# Patient Record
Sex: FEMALE | Race: WHITE | Employment: OTHER | URBAN - METROPOLITAN AREA
[De-identification: names, ages, dates, MRNs, and addresses within clinical notes are randomized per-mention and may not be internally consistent; named-entity substitution may affect disease eponyms.]

---

## 2024-05-09 ENCOUNTER — OFFICE VISIT (OUTPATIENT)
Dept: PRIMARY CARE CLINIC | Facility: CLINIC | Age: 69
End: 2024-05-09
Payer: MEDICARE

## 2024-05-09 VITALS
DIASTOLIC BLOOD PRESSURE: 68 MMHG | HEART RATE: 86 BPM | OXYGEN SATURATION: 96 % | SYSTOLIC BLOOD PRESSURE: 116 MMHG | BODY MASS INDEX: 17.68 KG/M2 | WEIGHT: 112.63 LBS | HEIGHT: 67 IN | RESPIRATION RATE: 16 BRPM

## 2024-05-09 DIAGNOSIS — F17.210 CIGARETTE NICOTINE DEPENDENCE WITHOUT COMPLICATION: ICD-10-CM

## 2024-05-09 DIAGNOSIS — E78.5 HYPERLIPIDEMIA, UNSPECIFIED HYPERLIPIDEMIA TYPE: ICD-10-CM

## 2024-05-09 DIAGNOSIS — Z12.31 SCREENING MAMMOGRAM FOR BREAST CANCER: ICD-10-CM

## 2024-05-09 DIAGNOSIS — D69.2 SENILE PURPURA: ICD-10-CM

## 2024-05-09 DIAGNOSIS — Z78.0 POSTMENOPAUSAL: ICD-10-CM

## 2024-05-09 DIAGNOSIS — Z12.11 SCREENING FOR COLON CANCER: ICD-10-CM

## 2024-05-09 DIAGNOSIS — Z11.59 ENCOUNTER FOR HEPATITIS C SCREENING TEST FOR LOW RISK PATIENT: ICD-10-CM

## 2024-05-09 DIAGNOSIS — Z76.89 ENCOUNTER TO ESTABLISH CARE WITH NEW DOCTOR: Primary | ICD-10-CM

## 2024-05-09 PROCEDURE — 1158F ADVNC CARE PLAN TLK DOCD: CPT | Mod: CPTII,S$GLB,, | Performed by: STUDENT IN AN ORGANIZED HEALTH CARE EDUCATION/TRAINING PROGRAM

## 2024-05-09 PROCEDURE — 3288F FALL RISK ASSESSMENT DOCD: CPT | Mod: CPTII,S$GLB,, | Performed by: STUDENT IN AN ORGANIZED HEALTH CARE EDUCATION/TRAINING PROGRAM

## 2024-05-09 PROCEDURE — 1160F RVW MEDS BY RX/DR IN RCRD: CPT | Mod: CPTII,S$GLB,, | Performed by: STUDENT IN AN ORGANIZED HEALTH CARE EDUCATION/TRAINING PROGRAM

## 2024-05-09 PROCEDURE — 99214 OFFICE O/P EST MOD 30 MIN: CPT | Mod: S$GLB,,, | Performed by: STUDENT IN AN ORGANIZED HEALTH CARE EDUCATION/TRAINING PROGRAM

## 2024-05-09 PROCEDURE — 3008F BODY MASS INDEX DOCD: CPT | Mod: CPTII,S$GLB,, | Performed by: STUDENT IN AN ORGANIZED HEALTH CARE EDUCATION/TRAINING PROGRAM

## 2024-05-09 PROCEDURE — 99999 PR PBB SHADOW E&M-NEW PATIENT-LVL IV: CPT | Mod: PBBFAC,,, | Performed by: STUDENT IN AN ORGANIZED HEALTH CARE EDUCATION/TRAINING PROGRAM

## 2024-05-09 PROCEDURE — 3074F SYST BP LT 130 MM HG: CPT | Mod: CPTII,S$GLB,, | Performed by: STUDENT IN AN ORGANIZED HEALTH CARE EDUCATION/TRAINING PROGRAM

## 2024-05-09 PROCEDURE — 1125F AMNT PAIN NOTED PAIN PRSNT: CPT | Mod: CPTII,S$GLB,, | Performed by: STUDENT IN AN ORGANIZED HEALTH CARE EDUCATION/TRAINING PROGRAM

## 2024-05-09 PROCEDURE — 3078F DIAST BP <80 MM HG: CPT | Mod: CPTII,S$GLB,, | Performed by: STUDENT IN AN ORGANIZED HEALTH CARE EDUCATION/TRAINING PROGRAM

## 2024-05-09 PROCEDURE — 1101F PT FALLS ASSESS-DOCD LE1/YR: CPT | Mod: CPTII,S$GLB,, | Performed by: STUDENT IN AN ORGANIZED HEALTH CARE EDUCATION/TRAINING PROGRAM

## 2024-05-09 PROCEDURE — 1159F MED LIST DOCD IN RCRD: CPT | Mod: CPTII,S$GLB,, | Performed by: STUDENT IN AN ORGANIZED HEALTH CARE EDUCATION/TRAINING PROGRAM

## 2024-05-09 NOTE — ASSESSMENT & PLAN NOTE
Reviewed PMHx, social hx, labs, vaccinations, screenings, medications. Ordered dexa, mammo, fobt and labs today.

## 2024-05-09 NOTE — PROGRESS NOTES
INTERNAL MEDICINE INITIAL VISIT NOTE      CHIEF COMPLAINT     Chief Complaint   Patient presents with    Establish Care    Pre-op Exam       HPI     Melani Brandt is a 68 y.o.  female who presents with sciatica of left leg, scoliosis, current tobacco use (12 cigs a day), trigger fingers, psoriasis (only skin is affected), hx of basal cell (3), squamous cell carcinoma(2) is here to establish care with me.  -wakes up with pain but gets better as the day goes on.   -no falls in the last year  -needs left hip replacement  -needs preop form filled out for cataract surgery but cannot do it until may 20th.     Advance Care Planning     Date: 05/09/2024    Power of   I initiated the process of voluntary advance care planning today and explained the importance of this process to the patient.  I introduced the concept of advance directives to the patient, as well. Then the patient received detailed information about the importance of designating a Health Care Power of  (HCPOA). She was also instructed to communicate with this person about their wishes for future healthcare, should she become sick and lose decision-making capacity. The patient has not previously appointed a HCPOA. After our discussion, the patient has decided to complete a HCPOA and has appointed her brother, health care agent: ashlee Brandt & health care agent number: [phone]. I spent a total time of 5 minutes discussing this issue with the patient.          Past Medical History:  Past Medical History:   Diagnosis Date    Sciatica of left side     Scoliosis        Past Surgical History:  Past Surgical History:   Procedure Laterality Date    BASAL CELL CARCINOMA EXCISION      s/p removal x3    breast implants Bilateral 1999    chin implant  2009    CLAVICLE SURGERY Right 1955    dental implants  2009    SQUAMOUS CELL CARCINOMA EXCISION      x2    TONSILLECTOMY  1959       Allergies:  Review of patient's allergies indicates:  "  Allergen Reactions    Chloromycetin [chloramphenicol]     Formaldehyde analogues Hives       Home Medications:  Prior to Admission medications    Not on File       Family History:  Family History   Problem Relation Name Age of Onset    No Known Problems Mother      Melanoma Father      Cardiomyopathy Father      Diabetes type II Father         Social History:  Social History     Tobacco Use    Smoking status: Every Day     Current packs/day: 0.50     Average packs/day: 0.5 packs/day for 50.4 years (25.2 ttl pk-yrs)     Types: Cigarettes     Start date: 1974    Smokeless tobacco: Never   Substance Use Topics    Alcohol use: Yes     Alcohol/week: 3.0 standard drinks of alcohol     Types: 3 Glasses of wine per week    Drug use: Never       Review of Systems:  Review of Systems   Constitutional:  Negative for chills and fever.   HENT:  Negative for rhinorrhea and sore throat.    Respiratory:  Negative for cough, chest tightness and shortness of breath.    Cardiovascular:  Negative for chest pain.   Gastrointestinal:  Negative for abdominal pain, blood in stool, constipation and diarrhea.   Genitourinary:  Negative for dysuria and hematuria.   Neurological:  Negative for dizziness, light-headedness and headaches.       Health Maintainence:   Td -due   Flu -refuses   Prevnar -does not like vaccines   Pneumovax -does not like vaccines  Zoster recommended it  MMG - ordered today  C-SCOPE -had one 15 yrs ago. Bad experience with prep. Will order fobt  DEXA ordered today  Hep C screening will order today  Low Dose CT scan in pts if 55-81 y/o with 30 pack yr smoking hx and currently smoke or have quit within past 15 yrs. Ordered it today. Never had one. She is willing to do it    PHYSICAL EXAM     /68 (BP Location: Left arm, Patient Position: Sitting, BP Method: Small (Manual))   Pulse 86   Resp 16   Ht 5' 7" (1.702 m)   Wt 51.1 kg (112 lb 10.5 oz)   SpO2 96%   BMI 17.64 kg/m²     GEN - A+OX4, NAD   HEENT - " PERRL  Neck - No cervical LAD.   CV - RRR, no m/r or carotid bruit  Chest - CTAB, no wheezing or rhonchi  Abd - S/NT/ND/+BS.   Ext - 2+BDP and radial pulses. No LE edema.   MSK -Normal gait.   Skin - No rash.    LABS     None in the chart. Ordered today    ASSESSMENT/PLAN   1. Encounter to establish care with new doctor  Assessment & Plan:  Reviewed PMHx, social hx, labs, vaccinations, screenings, medications. Ordered dexa, mammo, fobt and labs today.       2. Screening mammogram for breast cancer  -     Mammo Digital Screening Bilat w/ Josue; Future; Expected date: 05/09/2024    3. Postmenopausal  -     DXA Bone Density Axial Skeleton 1 or more sites; Future; Expected date: 05/09/2024    4. Screening for colon cancer  -     Fecal Immunochemical Test (iFOBT); Future; Expected date: 05/09/2024    5. Cigarette nicotine dependence without complication  -     CT Chest Lung Screening Low Dose; Future; Expected date: 05/09/2024    6. Hyperlipidemia, unspecified hyperlipidemia type  Assessment & Plan:  No lipid panel in chart. Ordered today. Not on statin. Uses tobacco. Will monitor     Orders:  -     CBC Auto Differential; Future; Expected date: 05/09/2024  -     Comprehensive Metabolic Panel; Future; Expected date: 05/09/2024  -     TSH; Future; Expected date: 05/09/2024  -     Lipid Panel; Future; Expected date: 05/09/2024    7. Encounter for hepatitis C screening test for low risk patient  -     HEPATITIS C ANTIBODY; Future; Expected date: 05/09/2024    8. Senile purpura  Assessment & Plan:  Per patient she bruises easily. No signs of major bleeding. Will monitor          RTC in 3 months, sooner if needed and depending on labs.    Bradley Robertson MD  Department of Internal Medicine   1:31 PM

## 2024-05-16 ENCOUNTER — HOSPITAL ENCOUNTER (OUTPATIENT)
Dept: RADIOLOGY | Facility: HOSPITAL | Age: 69
Discharge: HOME | End: 2024-05-16
Attending: STUDENT IN AN ORGANIZED HEALTH CARE EDUCATION/TRAINING PROGRAM
Payer: MEDICARE

## 2024-05-16 VITALS — WEIGHT: 112 LBS | HEIGHT: 67 IN | BODY MASS INDEX: 17.58 KG/M2

## 2024-05-16 DIAGNOSIS — Z78.0 POSTMENOPAUSAL: ICD-10-CM

## 2024-05-16 DIAGNOSIS — F17.210 CIGARETTE NICOTINE DEPENDENCE WITHOUT COMPLICATION: ICD-10-CM

## 2024-05-16 DIAGNOSIS — Z12.31 SCREENING MAMMOGRAM FOR BREAST CANCER: ICD-10-CM

## 2024-05-16 PROCEDURE — 77080 DXA BONE DENSITY AXIAL: CPT | Mod: 26,,, | Performed by: INTERNAL MEDICINE

## 2024-05-16 PROCEDURE — 77067 SCR MAMMO BI INCL CAD: CPT | Mod: TC

## 2024-05-16 PROCEDURE — 77063 BREAST TOMOSYNTHESIS BI: CPT | Mod: 26,,, | Performed by: RADIOLOGY

## 2024-05-16 PROCEDURE — 77080 DXA BONE DENSITY AXIAL: CPT | Mod: TC

## 2024-05-16 PROCEDURE — 71271 CT THORAX LUNG CANCER SCR C-: CPT | Mod: 26,,, | Performed by: STUDENT IN AN ORGANIZED HEALTH CARE EDUCATION/TRAINING PROGRAM

## 2024-05-16 PROCEDURE — 71271 CT THORAX LUNG CANCER SCR C-: CPT | Mod: TC

## 2024-05-16 PROCEDURE — 77067 SCR MAMMO BI INCL CAD: CPT | Mod: 26,,, | Performed by: RADIOLOGY

## 2024-05-21 ENCOUNTER — PATIENT MESSAGE (OUTPATIENT)
Dept: PRIMARY CARE CLINIC | Facility: CLINIC | Age: 69
End: 2024-05-21
Payer: MEDICARE

## 2024-05-21 DIAGNOSIS — F17.210 NICOTINE DEPENDENCE, CIGARETTES, UNCOMPLICATED: ICD-10-CM

## 2024-05-21 DIAGNOSIS — D64.9 ANEMIA, UNSPECIFIED TYPE: Primary | ICD-10-CM

## 2024-05-21 DIAGNOSIS — R91.1 SOLITARY PULMONARY NODULE: ICD-10-CM

## 2024-05-21 DIAGNOSIS — R93.89 ABNORMAL FINDING ON CT SCAN: ICD-10-CM

## 2024-05-21 NOTE — TELEPHONE ENCOUNTER
Anemic. Suspect CORBIN. Ordered iron panel. She will get it drawn. Lipids elevated. She would like to try diet. Asked pt to avoid fried, fatty food.   Also discussed LDCT chest. Needs to repeat in 6 months which I already ordered. Due 11/12/2024. Ordered lumbar xray to look at that area noted on LDCT.

## 2024-05-22 ENCOUNTER — TELEPHONE (OUTPATIENT)
Dept: PRIMARY CARE CLINIC | Facility: CLINIC | Age: 69
End: 2024-05-22
Payer: MEDICARE

## 2024-05-22 DIAGNOSIS — M81.0 AGE-RELATED OSTEOPOROSIS WITHOUT CURRENT PATHOLOGICAL FRACTURE: Primary | ICD-10-CM

## 2024-05-22 NOTE — TELEPHONE ENCOUNTER
Called PT. Informed PT that her mammogram was negative masses. Informed PT she Will repeat in 1 yr. Also informed PT, she does have osteoporosis. Asked PT is she Would she be interested in treatment? . . . *PT STATED, YES. SHE IS INTERESTED IN TREATMENT.           *PLEASE ADVISE.

## 2024-05-22 NOTE — TELEPHONE ENCOUNTER
Called PT. Informed PT the  Has placed a referral for endocrine. Informed PT They will call her a week later to schedule an appointment.

## 2024-05-24 ENCOUNTER — TELEPHONE (OUTPATIENT)
Dept: PRIMARY CARE CLINIC | Facility: CLINIC | Age: 69
End: 2024-05-24
Payer: MEDICARE

## 2024-05-24 ENCOUNTER — HOSPITAL ENCOUNTER (OUTPATIENT)
Dept: RADIOLOGY | Facility: HOSPITAL | Age: 69
Discharge: HOME | End: 2024-05-24
Attending: STUDENT IN AN ORGANIZED HEALTH CARE EDUCATION/TRAINING PROGRAM
Payer: MEDICARE

## 2024-05-24 DIAGNOSIS — R93.89 ABNORMAL FINDING ON CT SCAN: ICD-10-CM

## 2024-05-24 PROCEDURE — 72100 X-RAY EXAM L-S SPINE 2/3 VWS: CPT | Mod: 26,,, | Performed by: RADIOLOGY

## 2024-05-24 PROCEDURE — 72100 X-RAY EXAM L-S SPINE 2/3 VWS: CPT | Mod: TC

## 2024-05-24 NOTE — TELEPHONE ENCOUNTER
----- Message from Bradley Robertson MD sent at 5/24/2024  1:50 PM CDT -----  Please tell patient that her iron levels are low. I would like her to start taking iron supplements every other day with any citrus fruit or juice that contains vitamin c. The iron supplement will be over the counter, she can ask the pharmacist to make sure she is getting the correct one.

## 2024-05-24 NOTE — TELEPHONE ENCOUNTER
Called PT. Informed PT that her iron levels are low. Informed PT the DR. would like her to start taking iron supplements every other day with any citrus fruit or juice that contains vitamin c. Informed PT The iron supplement will be over the counter, and she can ask the pharmacist to make sure she is getting the correct one.

## 2024-05-30 ENCOUNTER — LAB VISIT (OUTPATIENT)
Dept: LAB | Facility: HOSPITAL | Age: 69
End: 2024-05-30
Attending: STUDENT IN AN ORGANIZED HEALTH CARE EDUCATION/TRAINING PROGRAM
Payer: MEDICARE

## 2024-05-30 DIAGNOSIS — Z12.11 SCREENING FOR COLON CANCER: ICD-10-CM

## 2024-05-30 PROCEDURE — 82274 ASSAY TEST FOR BLOOD FECAL: CPT | Performed by: STUDENT IN AN ORGANIZED HEALTH CARE EDUCATION/TRAINING PROGRAM

## 2024-06-04 ENCOUNTER — OFFICE VISIT (OUTPATIENT)
Dept: ENDOCRINOLOGY | Facility: CLINIC | Age: 69
End: 2024-06-04
Payer: MEDICARE

## 2024-06-04 ENCOUNTER — PATIENT MESSAGE (OUTPATIENT)
Dept: PRIMARY CARE CLINIC | Facility: CLINIC | Age: 69
End: 2024-06-04
Payer: MEDICARE

## 2024-06-04 ENCOUNTER — TELEPHONE (OUTPATIENT)
Dept: PRIMARY CARE CLINIC | Facility: CLINIC | Age: 69
End: 2024-06-04
Payer: MEDICARE

## 2024-06-04 VITALS
HEIGHT: 62 IN | OXYGEN SATURATION: 93 % | BODY MASS INDEX: 20.71 KG/M2 | WEIGHT: 112.56 LBS | SYSTOLIC BLOOD PRESSURE: 114 MMHG | HEART RATE: 84 BPM | DIASTOLIC BLOOD PRESSURE: 64 MMHG

## 2024-06-04 DIAGNOSIS — R19.5 POSITIVE FIT (FECAL IMMUNOCHEMICAL TEST): Primary | ICD-10-CM

## 2024-06-04 DIAGNOSIS — M81.0 AGE-RELATED OSTEOPOROSIS WITHOUT CURRENT PATHOLOGICAL FRACTURE: ICD-10-CM

## 2024-06-04 DIAGNOSIS — E78.5 HYPERLIPIDEMIA, UNSPECIFIED HYPERLIPIDEMIA TYPE: Primary | ICD-10-CM

## 2024-06-04 LAB — HEMOCCULT STL QL IA: POSITIVE

## 2024-06-04 PROCEDURE — 1125F AMNT PAIN NOTED PAIN PRSNT: CPT | Mod: CPTII,S$GLB,, | Performed by: NURSE PRACTITIONER

## 2024-06-04 PROCEDURE — 1101F PT FALLS ASSESS-DOCD LE1/YR: CPT | Mod: CPTII,S$GLB,, | Performed by: NURSE PRACTITIONER

## 2024-06-04 PROCEDURE — 3078F DIAST BP <80 MM HG: CPT | Mod: CPTII,S$GLB,, | Performed by: NURSE PRACTITIONER

## 2024-06-04 PROCEDURE — 99999 PR PBB SHADOW E&M-EST. PATIENT-LVL IV: CPT | Mod: PBBFAC,,, | Performed by: NURSE PRACTITIONER

## 2024-06-04 PROCEDURE — 3288F FALL RISK ASSESSMENT DOCD: CPT | Mod: CPTII,S$GLB,, | Performed by: NURSE PRACTITIONER

## 2024-06-04 PROCEDURE — 99204 OFFICE O/P NEW MOD 45 MIN: CPT | Mod: S$GLB,,, | Performed by: NURSE PRACTITIONER

## 2024-06-04 PROCEDURE — 1159F MED LIST DOCD IN RCRD: CPT | Mod: CPTII,S$GLB,, | Performed by: NURSE PRACTITIONER

## 2024-06-04 PROCEDURE — G2211 COMPLEX E/M VISIT ADD ON: HCPCS | Mod: S$GLB,,, | Performed by: NURSE PRACTITIONER

## 2024-06-04 PROCEDURE — 1160F RVW MEDS BY RX/DR IN RCRD: CPT | Mod: CPTII,S$GLB,, | Performed by: NURSE PRACTITIONER

## 2024-06-04 PROCEDURE — 3074F SYST BP LT 130 MM HG: CPT | Mod: CPTII,S$GLB,, | Performed by: NURSE PRACTITIONER

## 2024-06-04 PROCEDURE — 3008F BODY MASS INDEX DOCD: CPT | Mod: CPTII,S$GLB,, | Performed by: NURSE PRACTITIONER

## 2024-06-04 RX ORDER — PREDNISOLONE ACETATE 10 MG/ML
1 SUSPENSION/ DROPS OPHTHALMIC 4 TIMES DAILY
COMMUNITY
Start: 2024-05-23

## 2024-06-04 RX ORDER — KETOROLAC TROMETHAMINE 5 MG/ML
1 SOLUTION OPHTHALMIC 4 TIMES DAILY
COMMUNITY
Start: 2024-05-23

## 2024-06-04 RX ORDER — LOTEPREDNOL ETABONATE 3.8 MG/G
GEL OPHTHALMIC
COMMUNITY
Start: 2024-05-23

## 2024-06-04 RX ORDER — BROMFENAC SODIUM 0.7 MG/ML
SOLUTION/ DROPS OPHTHALMIC
COMMUNITY
Start: 2024-05-23

## 2024-06-04 RX ORDER — IBUPROFEN 800 MG/1
800 TABLET ORAL 3 TIMES DAILY
COMMUNITY
Start: 2024-01-29

## 2024-06-04 RX ORDER — OFLOXACIN 3 MG/ML
1 SOLUTION/ DROPS OPHTHALMIC 4 TIMES DAILY
COMMUNITY
Start: 2024-05-23

## 2024-06-04 NOTE — PROGRESS NOTES
Subjective:      Patient ID: Melani Brandt is a 69 y.o. female.    Chief Complaint:  Osteoporosis    History of Present Illness  Melani Brandt presents today for initial evaluation & management of osteoporosis. This is her first visit with me.     With regards to osteoporosis:     Family history of osteoporosis in none    Menopause at age in her late 40s and was on HRT for a short time     current medication: none     Calcium intake-citracal 1200 mg daily   Vit D intake-1000 IU daily (recently started last week)    Weight bearing exercise- she has not been exercising but plans to start doing lunges and squats. Has been suffering with sciatica and back pain. Limited by back pain.   Recent falls - denies  Denies use of assistive devices     They have made fall precautions in house   Dental work - she has implants - finished around 2023.    No recent fractures   Right collar bone at age 1  + significant height loss (>2 inches)    tob use - smoking cigs 10 daily (has decreased)   etoh use- cocktail with 1 oz of liquor or 4oz of wine     + current diarrhea or h/o malabsorption but attributes to stress     Denies chronic exposure to steroid therapy, anticoagulants, proton pump inhibitors or antiseizure medications.     + GERD, indigestion when bending over   denies gastric bypass surgery.     Denies history of thyroid disease, kidney stones or kidney disease.   + anemia and on Fe    Denies active malignancy, history of malignancy the involved the bone, prior radiation treatment, or unexplained elevations of alk phos on labs     She denies MI or stroke in last year     She is scheduled for left hip [facility] in July 2024 at [facility]    Last BMD dated 5/16/2024   COMPARISON:  No Comparisons     FINDINGS:  Lumbar spine (L1-L4):               T-score is 2.6, and Z-score is 4.7.   Femoral neck:                          T-score is -3.1, and Z-score is -1.2   Total hip:                                T-score is -3.1, and  Z-score is -1.6.   Distal 1/3 radius:                      Not applicable     Fracture Risk (FRAX)  17% risk of a major osteoporotic fracture in the next 10 years.  8.5% risk of hip fracture in the next 10 years.   Impression:   *Osteoporosis based on T-score below -2.5.  *Fracture risk is very high due to T-score below -3.0.     RECOMMENDATIONS:  *Daily calcium intake 1155-8892 mg, dietary sources preferred; Vitamin D 5394-1455 IU daily.  *Weight bearing exercise and fall precautions.  *If patient smokes recommend cessation.  *Given very high fracture risk, would consider anabolic agents (including teriparatide, abaloparatide, or romosozumab), denosumab or zoledronic acid as the preferred treatment options. Oral bisphosphonates can be considered as second-line therapy.  *Repeat BMD in 2 years..    5/24/2024 XRAY  COMPARISON:  Present exam interpreted after review of the appearance of the visualized spine as seen on a CT chest lung screening study May 16, 2024     FINDINGS:  Thoracolumbar levocurvature.  No acute fractures, preserved vertebral body heights and pedicles.  Scattered variable size of vertebral body endplate osteophytes, most prevalent about the region of the maximum curvature of the spine which is at the L1-L2, L2-L3, L3-L4, less so L4-L5 levels.  No spondylolysis.  Allowing for the scoliotic curve, no definite findings of anterior or retrolisthesis.  Severe disc narrowing, vacuum phenomenon, and opposing endplate sclerosis seen at L1-L2 and L3-L4 levels.  Severe disc narrowing and vacuum phenomenon seen at L2-L3 level.  Mild disc narrowing L4-L5 level.  Moderate disc narrowing and vacuum phenomenon L5-S1 level.  Multilevel facet arthropathic changes.  Intact right and left SI joints.  Impression:  As above    Review of Systems   Musculoskeletal:  Positive for arthralgias and back pain. Negative for gait problem and myalgias.     Objective:   Physical Exam  Constitutional:       Appearance: Normal  "appearance.   Musculoskeletal:      Comments: Scoliosis  No point tenderness to spine  Stiff gait; right leg shorter than left   Neurological:      Mental Status: She is alert.         Visit Vitals  /64 (BP Location: Left arm, Patient Position: Sitting, BP Method: Large (Manual))   Pulse 84   Ht 5' 2" (1.575 m)   Wt 51.1 kg (112 lb 8.7 oz)   SpO2 (!) 93%   BMI 20.58 kg/m²        Lab Review:   No results found for: "HGBA1C"   Lab Results   Component Value Date    CHOL 213 (H) 05/16/2024    HDL 60 05/16/2024    LDLCALC 136.4 05/16/2024    TRIG 83 05/16/2024    CHOLHDL 28.2 05/16/2024     Lab Results   Component Value Date     05/16/2024    K 4.6 05/16/2024     05/16/2024    CO2 24 05/16/2024     05/16/2024    BUN 15 05/16/2024    CREATININE 0.8 05/16/2024    CALCIUM 9.7 05/16/2024    PROT 6.9 05/16/2024    ALBUMIN 3.8 05/16/2024    BILITOT 0.3 05/16/2024    ALKPHOS 98 05/16/2024    AST 21 05/16/2024    ALT 13 05/16/2024    ANIONGAP 11 05/16/2024    TSH 2.138 05/16/2024     No results found for: "FYSMBRDT38PS"  Assessment and Plan     1. Hyperlipidemia, unspecified hyperlipidemia type        2. Age-related osteoporosis without current pathological fracture  Calcium, Timed Urine    Creatinine Clearance, Timed    Protein Electrophoresis, Serum    PTH, Intact    Tissue Transglutaminase, IgA    Renal Function Panel    Vitamin D    Ambulatory referral/consult to Endocrinology        Age-related osteoporosis without current pathological fracture  -- Risks include low weight,  race, menopause, smoking, ETOH  -- Reviewed basics of quantity versus quality  -- Reassuring she is not fracturing   -- Plan work up of accelerated bone loss to include 24 urine calcium, PTH, vit D, CMP   -- RDA of calcium (4632-1236 mg /day in divided doses) and vitamin D 2000iu a day  discussed  -- Calcium from food sources preferred. Given list of calcium in foods.  -- Fall precautions emphasized  -- +weight bearing " exercise  -- NOF.org website information given  -- Pharmacological therapy is recommended for patients with osteopenia if the 10 year probability of a hip fracture is >3% and 10 year probability of other major osteoporotic fractures is >20%.   -- Treatment options and potential side effects discussed for PO bisphosphonates, reclast, Denosumab, and Teriparatide.  -- Low risk for ONJ and atypical fractures reviewed and discussed. Alerted that if dental work needs to be done it should be done prior to initiating therapy   -- Discussed optimal duration of bisphosphonate use is unknown - drug holiday after 5-10 po versus drug hold ay after 3 reclast treatment   -- Repeat DEXA scan in 2 yrs time.     Discussed she would benefit from anabolic and she is agreeable. Will start with labs and urine and then consider forteo/tymlos.     Hyperlipidemia  Given information on foods      Visit today included increased complexity associated with the care of episodic problems osteoporosis and hyperlipidemia addressed and managing longitudinal care of the patient due to serious managed problems osteoporosis and hyperlipidemia     Follow up in about 1 year (around 6/4/2025).

## 2024-06-04 NOTE — PATIENT INSTRUCTIONS
"Check labs and urine   Discussed she would benefit from anabolic and she is agreeable. Will start with labs and urine and then consider forteo/tymlos.   Recheck bone density in 2 years.   HOW TO COLLECT AN ACCURATE   24 HOUR URINE SPECIMEN     I want you to collect EVERY drop of your urine during a 24 hour period. I do not care what the volume of the urine is as long as it represents every drop that you pass during that 24 hour period. If you need to have a bowel movement, you may separate the urine but I want every drop.     Begin the collection on a morning which is convenient for you. At that time, pass your urine, flush it down the toilet and note the exact time. Now you have an empty bladder and empty bottle. That starts the collection. Collect every drop all during the day and night until exactly the same time the next morning, when you should pass your urine and add it to the bottle.     Bring the closed container back to the same laboratory that issued the empty container.       High cholesterol foods to avoid/limit:     C: Cheese, milk, dairy  A: Animal Fats: hot dogs and hamburgers  G: "Getting it away from home": going out to eat a lot  E: Extra Fat: cookies, candy, and sweets  F: Family History    Remember high cholesterol can clog your arteries and increase risk for heart attack or stroke.     Tscore  -1.0 osteopenia- low bone mass   -2.5 osteoporosis  -3.0 is severe osteoporosis     FRAX score   >20% major bone in body   >3% hip    Recommended daily allowance of calcium is 3040-4565 mg daily, preferably from food. See below  Recommended daily allowance of vitamin D is 5514-9889 IU daily    Encouraged weight bearing exercise  Encouraged to avoid falls  Avoid alcohol and tobacco    Estimated Calcium Content of Foods:  Produce  Serving Size Estimated Calcium*    Rik greens, frozen 8 oz 360 mg   Broccoli val[place] 8 oz 200 mg   Kale, frozen 8 oz 180 mg   Soy Beans, green, boiled 8 oz 175 mg   Bok Carlos Enrique, " cooked, boiled 8 oz 160 mg   Figs, dried 2 figs 65 mg   Broccoli, fresh, cooked 8 oz 60 mg   Oranges 1 whole 55 mg   Seafood Serving Size Estimated Calcium*    Sardines, canned with bones 3 oz 325 mg   [place], canned with bones 3 oz 180 mg   Shrimp, canned 3 oz 125 mg   Dairy Serving Size Estimated Calcium*    Ricotta, part-skim 4 oz 335 mg   Yogurt, plain, low-fat 6 oz 310 mg   Milk, skim, low-fat, whole 8 oz 300 mg   Yogurt with fruit, low-fat 6 oz 260 mg   Mozzarella, part-skim 1 oz 210 mg   Cheddar 1 oz 205 mg   Yogurt, Greek 6 oz 200 mg   American Cheese 1 oz 195 mg   Feta Cheese 4 oz 140 mg   Cottage Cheese, 2% 4 oz 105 mg   Frozen yogurt, vanilla 8 oz 105 mg   Ice Cream, vanilla 8 oz 85 mg   Parmesan 1 tbsp 55 mg   Fortified Food Serving Size Estimated Calcium*   [place] milk, rice milk or soy milk, fortified 8 oz 300 mg   Orange juice and other fruit juices, fortified 8 oz 300 mg   Tofu, prepared with calcium 4 oz 205 mg   Waffle, frozen, fortified 2 pieces 200 mg   Oatmeal, fortified 1 packet 140 mg   English muffin, fortified 1 muffin 100 mg   Cereal, fortified 8 oz 100-1,000 mg   Other Serving Size Estimated Calcium*   Mac & cheese, frozen 1 package 325 mg   Pizza, cheese, frozen 1 serving 115 mg   Pudding, chocolate, prepared with 2% milk 4 oz 160 mg   Beans, baked, canned 4 oz 160 mg   *The calcium content listed for most foods is estimated and can vary due to multiple factors. Check the food label to determine how much calcium is in a particular product.  If you read the nutrition label for a food source, it lists the % calcium in that food.  For an 8 oz glass of milk, for example, the label states calcium 30%.  This is equivalent to 300 mg of calcium (multiply the listed number by 10).   **Table from the National Osteoporosis Foundation      Osteoporosis medications  These are the medications we discussed for osteoporosis treatment:    - Bisphosphonates:         - these slow down bone loss         -  "oral forms (Fosamax and Actonel are examples) - taken once weekly or once monthly         - IV form (Reclast) - given intravenously once yearly    - Prolia (denosumab):          - slows down bone loss           - it is a subcutaneous injection (under the skin) every 6 months, given in the office    Side effects of Prolia reviewed, including risk of hypocalcemia, eczema/skin complications and possible increased risk of infections.  Also reviewed association with ONJ and rare atypical femur fractures.  Advise to see the dentist regularly, notify dentist of using this medication and avoid non-emergent dental implants and extractions.  Also advise to contact us if develops new, persistent thigh or groin pain.    Discussed ongoing concern and accumulating data describing rebound-associated vertebral fractures (RAVFs) after denosumab discontinuation.  We now know that discontinuation of denosumab is associated with up to 50% reduction in BMD that is only partially blocked by Reclast.  Current recommendations are to either continue indefinitely or to switch to oral bisphosphonate for at least a year.    Anabolic's     - Forteo (teriparatide) or Tymlos (abaloparatide):           - medications that "build bone" or increases bone formation           - subcutaneous injection (under the skin) taken once daily that you self-administer at home for 18-24 months   SE hypotension, headaches, and joint aches bone pains, elevated calcium     -Evenity (romosozumab)   -medications that "build bone" or increases bone formation   - subcutaneous injection (under the skin) taken once monthly for one year   - infusion center     Evenity (210mg) once a month subcutaneous injection used for one year. Blocks the effects of sclerostin, a protein that prevents bone production and causes bone breakdown. Dual action of bone building and antiresorptive.  Trials did show there was an increased risk of cardiovascular disease (CI in patients with " history of MI or stroke).  Increased risk is 50% from baseline and 1.5% relative risk.  Rare, but a possibility.   Most common side effects are joint pain and headaches.  Use it for one year followed by antiresorptive - Prolia or bisphosphonate. Use prolia.     Receive injection in infusion center.     For more information on medications: https://www.nof.org/patients/treatment/medicationadherence/

## 2024-06-04 NOTE — TELEPHONE ENCOUNTER
----- Message from Bradley Robertson MD sent at 6/4/2024  2:39 PM CDT -----  Pls let patient know that her stool test was positive so I ordered a colonoscopy. They will reach out to her to make an appointment

## 2024-06-04 NOTE — ASSESSMENT & PLAN NOTE
-- Risks include low weight,  race, menopause, smoking, ETOH  -- Reviewed basics of quantity versus quality  -- Reassuring she is not fracturing   -- Plan work up of accelerated bone loss to include 24 urine calcium, PTH, vit D, CMP   -- RDA of calcium (4137-7606 mg /day in divided doses) and vitamin D 2000iu a day  discussed  -- Calcium from food sources preferred. Given list of calcium in foods.  -- Fall precautions emphasized  -- +weight bearing exercise  -- [employer] website information given  -- Pharmacological therapy is recommended for patients with osteopenia if the 10 year probability of a hip fracture is >3% and 10 year probability of other major osteoporotic fractures is >20%.   -- Treatment options and potential side effects discussed for PO bisphosphonates, reclast, Denosumab, and Teriparatide.  -- Low risk for ONJ and atypical fractures reviewed and discussed. Alerted that if dental work needs to be done it should be done prior to initiating therapy   -- Discussed optimal duration of bisphosphonate use is unknown - drug holiday after 5-10 po versus drug hold ay after 3 reclast treatment   -- Repeat DEXA scan in 2 yrs time.     Discussed she would benefit from anabolic and she is agreeable. Will start with labs and urine and then consider forteo/tymlos.

## 2024-06-06 ENCOUNTER — TELEPHONE (OUTPATIENT)
Dept: PRIMARY CARE CLINIC | Facility: CLINIC | Age: 69
End: 2024-06-06
Payer: MEDICARE

## 2024-06-06 NOTE — TELEPHONE ENCOUNTER
----- Message from Nat Ledesma sent at 6/5/2024  3:41 PM CDT -----  Contact: Pt [phone]    ----- Message -----  From: Cheyenne Ashley  Sent: 6/5/2024   3:07 PM CDT  To: Marcelo Huerta Staff    Patient is returning a phone call.  Who left a message for the patient: Florence Corral MA  Does patient know what this is regarding:  Results   Would you like a call back, or a response through your [facility] portal?:   call back   Comments:

## 2024-06-10 ENCOUNTER — LAB VISIT (OUTPATIENT)
Dept: LAB | Facility: HOSPITAL | Age: 69
End: 2024-06-10
Attending: NURSE PRACTITIONER
Payer: MEDICARE

## 2024-06-10 DIAGNOSIS — M81.0 AGE-RELATED OSTEOPOROSIS WITHOUT CURRENT PATHOLOGICAL FRACTURE: ICD-10-CM

## 2024-06-10 LAB
25(OH)D3+25(OH)D2 SERPL-MCNC: 49 NG/ML (ref 30–96)
ALBUMIN SERPL BCP-MCNC: 3.7 G/DL (ref 3.5–5.2)
ANION GAP SERPL CALC-SCNC: 8 MMOL/L (ref 8–16)
BUN SERPL-MCNC: 15 MG/DL (ref 8–23)
CALCIUM SERPL-MCNC: 9.5 MG/DL (ref 8.7–10.5)
CHLORIDE SERPL-SCNC: 106 MMOL/L (ref 95–110)
CO2 SERPL-SCNC: 24 MMOL/L (ref 23–29)
CREAT SERPL-MCNC: 0.8 MG/DL (ref 0.5–1.4)
EST. GFR  (NO RACE VARIABLE): >60 ML/MIN/1.73 M^2
GLUCOSE SERPL-MCNC: 104 MG/DL (ref 70–110)
PHOSPHATE SERPL-MCNC: 3 MG/DL (ref 2.7–4.5)
POTASSIUM SERPL-SCNC: 4.5 MMOL/L (ref 3.5–5.1)
PTH-INTACT SERPL-MCNC: 37.5 PG/ML (ref 9–77)
SODIUM SERPL-SCNC: 138 MMOL/L (ref 136–145)

## 2024-06-10 PROCEDURE — 84165 PROTEIN E-PHORESIS SERUM: CPT | Mod: 26,,, | Performed by: PATHOLOGY

## 2024-06-10 PROCEDURE — 84165 PROTEIN E-PHORESIS SERUM: CPT | Performed by: NURSE PRACTITIONER

## 2024-06-10 PROCEDURE — 86364 TISS TRNSGLTMNASE EA IG CLAS: CPT | Performed by: NURSE PRACTITIONER

## 2024-06-10 PROCEDURE — 80069 RENAL FUNCTION PANEL: CPT | Performed by: NURSE PRACTITIONER

## 2024-06-10 PROCEDURE — 83970 ASSAY OF PARATHORMONE: CPT | Performed by: NURSE PRACTITIONER

## 2024-06-10 PROCEDURE — 36415 COLL VENOUS BLD VENIPUNCTURE: CPT | Performed by: NURSE PRACTITIONER

## 2024-06-10 PROCEDURE — 82306 VITAMIN D 25 HYDROXY: CPT | Performed by: NURSE PRACTITIONER

## 2024-06-11 LAB
ALBUMIN SERPL ELPH-MCNC: 4.04 G/DL (ref 3.35–5.55)
ALPHA1 GLOB SERPL ELPH-MCNC: 0.27 G/DL (ref 0.17–0.41)
ALPHA2 GLOB SERPL ELPH-MCNC: 0.56 G/DL (ref 0.43–0.99)
B-GLOBULIN SERPL ELPH-MCNC: 0.69 G/DL (ref 0.5–1.1)
GAMMA GLOB SERPL ELPH-MCNC: 0.63 G/DL (ref 0.67–1.58)
PROT SERPL-MCNC: 6.2 G/DL (ref 6–8.4)

## 2024-06-13 ENCOUNTER — CLINICAL SUPPORT (OUTPATIENT)
Dept: ENDOSCOPY | Facility: HOSPITAL | Age: 69
End: 2024-06-13
Attending: STUDENT IN AN ORGANIZED HEALTH CARE EDUCATION/TRAINING PROGRAM
Payer: MEDICARE

## 2024-06-13 ENCOUNTER — TELEPHONE (OUTPATIENT)
Dept: ENDOSCOPY | Facility: HOSPITAL | Age: 69
End: 2024-06-13

## 2024-06-13 DIAGNOSIS — R19.5 POSITIVE FIT (FECAL IMMUNOCHEMICAL TEST): ICD-10-CM

## 2024-06-13 LAB
PATHOLOGIST INTERPRETATION SPE: NORMAL
TTG IGA SER-ACNC: <0.2 U/ML

## 2024-06-13 NOTE — TELEPHONE ENCOUNTER
Spoke to pt to schedule procedure(s) Colonoscopy       Physician to perform procedure(s) Dr. KARTHIKEYAN Estrada  Date of Procedure (s) 7/24/24  Arrival Time 7:45 AM  Time of Procedure(s) 8:45 AM   Location of Procedure(s) [facility] 2nd Floor  Type of Rx Prep sent to patient: Miralax  Instructions provided to patient via [facility]    Patient was informed on the following information and verbalized understanding. Screening questionnaire reviewed with patient and complete. If procedure requires anesthesia, a responsible adult needs to be present to accompany the patient home, patient cannot drive after receiving anesthesia. Appointment details are tentative, especially check-in time. Patient will receive a prep-op call 7 days prior to confirm check-in time for procedure. If applicable the patient should contact their pharmacy to verify Rx for procedure prep is ready for pick-up. Patient was advised to call the scheduling department at [phone] if pharmacy states no Rx is available. Patient was advised to call the endoscopy scheduling department if any questions or concerns arise.      [facility] Scheduling Department

## 2024-06-17 DIAGNOSIS — M81.0 AGE-RELATED OSTEOPOROSIS WITHOUT CURRENT PATHOLOGICAL FRACTURE: Primary | ICD-10-CM

## 2024-06-17 RX ORDER — TERIPARATIDE 250 UG/ML
20 INJECTION, SOLUTION SUBCUTANEOUS DAILY
Qty: 2.4 ML | Refills: 11 | OUTPATIENT
Start: 2024-06-17 | End: 2024-06-19

## 2024-06-19 ENCOUNTER — PATIENT MESSAGE (OUTPATIENT)
Dept: ENDOCRINOLOGY | Facility: CLINIC | Age: 69
End: 2024-06-19
Payer: MEDICARE

## 2024-06-19 DIAGNOSIS — M81.0 AGE-RELATED OSTEOPOROSIS WITHOUT CURRENT PATHOLOGICAL FRACTURE: Primary | ICD-10-CM

## 2024-06-19 DIAGNOSIS — M81.0 AGE-RELATED OSTEOPOROSIS WITHOUT CURRENT PATHOLOGICAL FRACTURE: ICD-10-CM

## 2024-06-19 RX ORDER — TERIPARATIDE 250 UG/ML
20 INJECTION, SOLUTION SUBCUTANEOUS DAILY
Qty: 2.4 ML | Refills: 11 | Status: CANCELLED | OUTPATIENT
Start: 2024-06-19 | End: 2025-06-19

## 2024-06-19 RX ORDER — TERIPARATIDE 250 UG/ML
20 INJECTION, SOLUTION SUBCUTANEOUS DAILY
Qty: 2.48 ML | Refills: 11 | Status: ACTIVE | OUTPATIENT
Start: 2024-06-19 | End: 2025-06-19

## 2024-06-27 ENCOUNTER — PATIENT MESSAGE (OUTPATIENT)
Dept: ENDOSCOPY | Facility: HOSPITAL | Age: 69
End: 2024-06-27
Payer: MEDICARE

## 2024-07-10 ENCOUNTER — PATIENT MESSAGE (OUTPATIENT)
Dept: ENDOCRINOLOGY | Facility: CLINIC | Age: 69
End: 2024-07-10
Payer: MEDICARE

## 2024-07-16 DIAGNOSIS — M81.0 AGE-RELATED OSTEOPOROSIS WITHOUT CURRENT PATHOLOGICAL FRACTURE: Primary | ICD-10-CM

## 2024-07-16 RX ORDER — ALENDRONATE SODIUM 70 MG/1
70 TABLET ORAL
Qty: 12 TABLET | Refills: 3 | Status: SHIPPED | OUTPATIENT
Start: 2024-07-16

## 2024-07-19 ENCOUNTER — TELEPHONE (OUTPATIENT)
Dept: ENDOSCOPY | Facility: HOSPITAL | Age: 69
End: 2024-07-19
Payer: MEDICARE

## 2024-07-22 ENCOUNTER — ANESTHESIA EVENT (OUTPATIENT)
Dept: ENDOSCOPY | Facility: HOSPITAL | Age: 69
End: 2024-07-22
Payer: MEDICARE

## 2024-07-22 NOTE — ANESTHESIA PREPROCEDURE EVALUATION
07/22/2024  Melani Brandt is a 69 y.o., female.    Past Medical History:   Diagnosis Date    Psoriasis     Scalp and elbows    Sciatica of left side     Scoliosis      Past Surgical History:   Procedure Laterality Date    AUGMENTATION OF BREAST      BASAL CELL CARCINOMA EXCISION      s/p removal x3    breast implants Bilateral 1999    chin implant  2009    CLAVICLE SURGERY Right 1955    dental implants  2009    SQUAMOUS CELL CARCINOMA EXCISION      x2    TONSILLECTOMY  1959       Pre-op Assessment    I have reviewed the Patient Summary Reports.     I have reviewed the Nursing Notes. I have reviewed the NPO Status.   I have reviewed the Medications.     Review of Systems  Social:  Smoker, Alcohol Use Started 1974; 0.5 packs/day; Smoked an average of 0.5 packs/day for 50.6 years; Total pack years: 25.3  Smokeless Tobacco: Never used smokeless tobacco.          Hematology/Oncology:  Hematology Normal   Oncology Normal                                   EENT/Dental:  EENT/Dental Normal           Cardiovascular:  Cardiovascular Normal                                            Pulmonary:  Pulmonary Normal                       Renal/:  Renal/ Normal                 Hepatic/GI:  Hepatic/GI Normal                 Musculoskeletal:  Musculoskeletal Normal                Neurological:    Neuromuscular Disease,                                   Endocrine:  Endocrine Normal            Dermatological:  Skin Normal    Psych:  Psychiatric Normal                  Physical Exam    Airway:  Mallampati: II     Anesthesia Plan  Type of Anesthesia, risks & benefits discussed:    Anesthesia Type: Gen Natural Airway  Intra-op Monitoring Plan: Standard ASA Monitors  Induction:  IV  Informed Consent: Informed consent signed with the Patient and all parties understand the risks and agree with anesthesia plan.  All  questions answered.   ASA Score: 3  Day of Surgery Review of History & Physical: H&P Update referred to the surgeon/provider.    Ready For Surgery From Anesthesia Perspective.     .

## 2024-07-24 ENCOUNTER — HOSPITAL ENCOUNTER (OUTPATIENT)
Facility: HOSPITAL | Age: 69
Discharge: HOME | End: 2024-07-24
Attending: INTERNAL MEDICINE | Admitting: INTERNAL MEDICINE
Payer: MEDICARE

## 2024-07-24 ENCOUNTER — ANESTHESIA (OUTPATIENT)
Dept: ENDOSCOPY | Facility: HOSPITAL | Age: 69
End: 2024-07-24
Payer: MEDICARE

## 2024-07-24 VITALS
SYSTOLIC BLOOD PRESSURE: 129 MMHG | OXYGEN SATURATION: 100 % | HEART RATE: 80 BPM | TEMPERATURE: 98 F | BODY MASS INDEX: 19.39 KG/M2 | WEIGHT: 106 LBS | RESPIRATION RATE: 19 BRPM | DIASTOLIC BLOOD PRESSURE: 72 MMHG

## 2024-07-24 DIAGNOSIS — R19.5 POSITIVE FIT (FECAL IMMUNOCHEMICAL TEST): Primary | ICD-10-CM

## 2024-07-24 PROCEDURE — 37000008 HC ANESTHESIA 1ST 15 MINUTES: Performed by: INTERNAL MEDICINE

## 2024-07-24 PROCEDURE — 94761 N-INVAS EAR/PLS OXIMETRY MLT: CPT

## 2024-07-24 PROCEDURE — 99900035 HC TECH TIME PER 15 MIN (STAT)

## 2024-07-24 PROCEDURE — 25000003 PHARM REV CODE 250: Performed by: NURSE ANESTHETIST, CERTIFIED REGISTERED

## 2024-07-24 PROCEDURE — 37000009 HC ANESTHESIA EA ADD 15 MINS: Performed by: INTERNAL MEDICINE

## 2024-07-24 PROCEDURE — 45385 COLONOSCOPY W/LESION REMOVAL: CPT | Mod: PT,,, | Performed by: INTERNAL MEDICINE

## 2024-07-24 PROCEDURE — 63600175 PHARM REV CODE 636 W HCPCS: Performed by: NURSE ANESTHETIST, CERTIFIED REGISTERED

## 2024-07-24 PROCEDURE — 88305 TISSUE EXAM BY PATHOLOGIST: CPT | Performed by: PATHOLOGY

## 2024-07-24 PROCEDURE — 45385 COLONOSCOPY W/LESION REMOVAL: CPT | Mod: PT | Performed by: INTERNAL MEDICINE

## 2024-07-24 PROCEDURE — 27201089 HC SNARE, DISP (ANY): Performed by: INTERNAL MEDICINE

## 2024-07-24 RX ORDER — SODIUM CHLORIDE 9 MG/ML
INJECTION, SOLUTION INTRAVENOUS CONTINUOUS
Status: DISCONTINUED | OUTPATIENT
Start: 2024-07-24 | End: 2024-07-24 | Stop reason: HOSPADM

## 2024-07-24 RX ORDER — LIDOCAINE HYDROCHLORIDE 20 MG/ML
INJECTION INTRAVENOUS
Status: DISCONTINUED | OUTPATIENT
Start: 2024-07-24 | End: 2024-07-24

## 2024-07-24 RX ORDER — PROPOFOL 10 MG/ML
VIAL (ML) INTRAVENOUS
Status: DISCONTINUED | OUTPATIENT
Start: 2024-07-24 | End: 2024-07-24

## 2024-07-24 RX ADMIN — SODIUM CHLORIDE: 0.9 INJECTION, SOLUTION INTRAVENOUS at 09:07

## 2024-07-24 RX ADMIN — LIDOCAINE HYDROCHLORIDE 100 MG: 20 INJECTION INTRAVENOUS at 09:07

## 2024-07-24 RX ADMIN — PROPOFOL 100 MG: 10 INJECTION, EMULSION INTRAVENOUS at 09:07

## 2024-07-24 NOTE — TRANSFER OF CARE
Anesthesia Transfer of Care Note    Patient: Melani Brandt    Procedure(s) Performed: Procedure(s) (LRB):  COLONOSCOPY (N/A)    Patient location: PACU    Anesthesia Type: general    Transport from OR: Transported from OR on room air with adequate spontaneous ventilation    Post pain: adequate analgesia    Post assessment: no apparent anesthetic complications    Post vital signs: stable    Level of consciousness: awake    Nausea/Vomiting: no nausea/vomiting    Complications: none    Transfer of care protocol was followed      Last vitals: Visit Vitals  /75   Pulse 83   Temp 36.7 °C (98.1 °F) (Temporal)   Resp 16   Wt 48.1 kg (106 lb)   SpO2 97%   Breastfeeding No   BMI 19.39 kg/m²

## 2024-07-24 NOTE — PLAN OF CARE
Pt in preop bay 06, VSS and IV inserted. Pt denies any open wounds on body or the use of any weight loss injections. Pt needs a procedural consents, and anesthesia consents, otherwise ready to roll.

## 2024-07-24 NOTE — [employer]ATION PATIENT INSTRUCTIONS
Discharge Summary/Instructions after an Endoscopic Procedure  Patient Name: Melani Brandt  Patient MRN: [id number]  Patient YOB: 1955 Wednesday, July 24, 2024  Izaiah Estrada MD  Dear patient,  As a result of recent federal legislation (The Federal Cures Act), you may   receive lab or pathology results from your procedure in your [facility]   account before your physician is able to contact you. Your physician or   their representative will relay the results to you with their   recommendations at their soonest availability.  Thank you,  RESTRICTIONS:  During your procedure today, you received medications for sedation.  These   medications may affect your judgment, balance and coordination.  Therefore,   for 24 hours, you have the following restrictions:   - DO NOT drive a car, operate machinery, make legal/financial decisions,   sign important papers or drink alcohol.    ACTIVITY:  Today: no heavy lifting, straining or running due to procedural   sedation/anesthesia.  The following day: return to full activity including work.  DIET:  Eat and drink normally unless instructed otherwise.     TREATMENT FOR COMMON SIDE EFFECTS:  - Mild abdominal pain, nausea, belching, bloating or excessive gas:  rest,   eat lightly and use a heating pad.  - Sore Throat: treat with throat lozenges and/or gargle with warm salt   water.  - Because air was used during the procedure, expelling large amounts of air   from your rectum or belching is normal.  - If a bowel prep was taken, you may not have a bowel movement for 1-3 days.    This is normal.  SYMPTOMS TO WATCH FOR AND REPORT TO YOUR PHYSICIAN:  1. Abdominal pain or bloating, other than gas cramps.  2. Chest pain.  3. Back pain.  4. Signs of infection such as: chills or fever occurring within 24 hours   after the procedure.  5. Rectal bleeding, which would show as bright red, maroon, or black stools.   (A tablespoon of blood from the rectum is not serious, especially if    hemorrhoids are present.)  6. Vomiting.  7. Weakness or dizziness.  GO DIRECTLY TO THE NEAREST EMERGENCY ROOM IF YOU HAVE ANY OF THE FOLLOWING:      Difficulty breathing              Chills and/or fever over 101 F   Persistent vomiting and/or vomiting blood   Severe abdominal pain   Severe chest pain   Black, tarry stools   Bleeding- more than one tablespoon   Any other symptom or condition that you feel may need urgent attention  Your doctor recommends these additional instructions:  If any biopsies were taken, your doctors clinic will contact you in 1 to 2   weeks with any results.  - Patient has a contact number available for emergencies.  The signs and   symptoms of potential delayed complications were discussed with the   patient.  Return to normal activities tomorrow.  Written discharge   instructions were provided to the patient.   - Discharge patient to home.   - Resume previous diet.   - Continue present medications.   - Await pathology results.   - Repeat colonoscopy in 5 years for surveillance.   For questions, problems or results please call your physician - Izaiah Estrada MD at Work:  [phone].  [facility], EMERGENCY ROOM PHONE NUMBER: [phone]  IF A COMPLICATION OR EMERGENCY SITUATION ARISES AND YOU ARE UNABLE TO REACH   YOUR PHYSICIAN - GO DIRECTLY TO THE EMERGENCY ROOM.  Izaiah Estrada MD  7/24/2024 10:17:28 AM  This report has been verified and signed electronically.  Dear patient,  As a result of recent federal legislation (The Federal Cures Act), you may   receive lab or pathology results from your procedure in your [facility]   account before your physician is able to contact you. Your physician or   their representative will relay the results to you with their   recommendations at their soonest availability.  Thank you,  PROVATION

## 2024-07-24 NOTE — ANESTHESIA POSTPROCEDURE EVALUATION
Anesthesia Post Evaluation    Patient: Melani Brandt    Procedure(s) Performed: Procedure(s) (LRB):  COLONOSCOPY (N/A)    Final Anesthesia Type: general      Patient location during evaluation: PACU  Patient participation: Yes- Able to Participate  Level of consciousness: awake and alert  Post-procedure vital signs: reviewed and stable  Pain management: adequate  Airway patency: patent    PONV status at discharge: No PONV  Anesthetic complications: no      Cardiovascular status: stable  Respiratory status: spontaneous ventilation  Hydration status: euvolemic  Follow-up not needed.          Vitals Value Taken Time   /72 07/24/24 1036   Temp 36.7 °C (98.1 °F) 07/24/24 1016   Pulse 80 07/24/24 1036   Resp 19 07/24/24 1036   SpO2 100 % 07/24/24 1036         Event Time   Out of Recovery 10:40:00         Pain/Balaji Score: Balaji Score: 10 (7/24/2024 10:25 AM)

## 2024-07-24 NOTE — H&P
Short Stay Endoscopy History and Physical    PCP - Bradley Robertson MD    Procedure - Colonoscopy  Sedation: GA  ASA - per anesthesia  Mallampati - per anesthesia  History of Anesthesia problems - no  Family history Anesthesia problems -  no     HPI:  This is a 69 y.o. female here for evaluation of : Positive FIT    Reflux - no  Dysphagia - no  Abdominal pain - no  Diarrhea - no    ROS:  Constitutional: No fevers, chills, No weight loss  ENT: No allergies  CV: No chest pain  Pulm: No cough, No shortness of breath  Ophtho: No vision changes  GI: see HPI  Medical History:  has a past medical history of Psoriasis, Sciatica of left side, and Scoliosis.    Surgical History:  has a past surgical history that includes Clavicle surgery (Right, 1955); Tonsillectomy (1959); breast implants (Bilateral, 1999); dental implants (2009); chin implant (2009); Excision basal cell carcinoma; Squamous cell carcinoma excision; and Augmentation of breast.    Family History: family history includes Cardiomyopathy in her father; Diabetes type II in her father; Melanoma in her father; No Known Problems in her mother.. Otherwise no colon cancer, inflammatory bowel disease, or GI malignancies.    Social History:  reports that she has been smoking cigarettes. She started smoking about 50 years ago. She has a 25.3 pack-year smoking history. She has never used smokeless tobacco. She reports current alcohol use of about 3.0 standard drinks of alcohol per week. She reports that she does not use drugs.    Review of patient's allergies indicates:   Allergen Reactions    Formaldehyde Swelling    Chloromycetin [chloramphenicol]     Formaldehyde analogues Hives       Medications:   Medications Prior to Admission   Medication Sig Dispense Refill Last Dose     mg tablet Take 800 mg by mouth 3 (three) times daily.   Past Month    ketorolac 0.5% (ACULAR) 0.5 % Drop Place 1 drop into both eyes 4 (four) times daily.   7/24/2024    prednisoLONE  acetate (PRED FORTE) 1 % DrpS Place 1 drop into both eyes 4 (four) times daily.   7/24/2024    alendronate (FOSAMAX) 70 MG tablet Take 1 tablet (70 mg total) by mouth every 7 days. Take with a full glass of water & remain upright for at least 30 minutes 12 tablet 3 7/20/2024    LOTEMAX SM 0.38 % DrpG        ofloxacin (OCUFLOX) 0.3 % ophthalmic solution Place 1 drop into both eyes 4 (four) times daily.       PROLENSA 0.07 % Drop SMARTSIG:In Eye(s)          Objective Findings:    Vital Signs: Per nursing notes.    Physical Exam:  General Appearance: Well appearing in no acute distress  Head:   Normocephalic, without obvious abnormality  Eyes:    No scleral icterus  Airway: Open  Neck: No restriction in mobility  Lungs: CTA bilaterally in anterior and posterior fields, no wheezes, no crackles.  Heart:  Regular rate and rhythm, S1, S2 normal, no murmurs heard  Abdomen: Soft, non tender, non distended      Labs:  Lab Results   Component Value Date    WBC 10.43 05/16/2024    HGB 10.1 (L) 05/16/2024    HCT 34.9 (L) 05/16/2024     (H) 05/16/2024    CHOL 213 (H) 05/16/2024    TRIG 83 05/16/2024    HDL 60 05/16/2024    ALT 13 05/16/2024    AST 21 05/16/2024     06/10/2024    K 4.5 06/10/2024     06/10/2024    CREATININE 0.8 06/10/2024    BUN 15 06/10/2024    CO2 24 06/10/2024    TSH 2.138 05/16/2024         I have explained the risks and benefits of endoscopy procedures to the patient including but not limited to bleeding, perforation, infection, and death.    Thank you so much for allowing me to participate in the care of Melani Estrada MD

## 2024-07-26 LAB
FINAL PATHOLOGIC DIAGNOSIS: NORMAL
GROSS: NORMAL
Lab: NORMAL

## 2024-08-21 ENCOUNTER — TELEPHONE (OUTPATIENT)
Dept: GASTROENTEROLOGY | Facility: CLINIC | Age: 69
End: 2024-08-21
Payer: MEDICARE

## 2024-08-21 NOTE — TELEPHONE ENCOUNTER
----- Message from Izaiah Estrada MD sent at 8/20/2024  4:00 PM CDT -----  Please call and notify patient, the colon polyps were benign.

## 2024-08-27 ENCOUNTER — OFFICE VISIT (OUTPATIENT)
Dept: PRIMARY CARE CLINIC | Facility: CLINIC | Age: 69
End: 2024-08-27
Payer: MEDICARE

## 2024-08-27 VITALS
DIASTOLIC BLOOD PRESSURE: 66 MMHG | SYSTOLIC BLOOD PRESSURE: 124 MMHG | RESPIRATION RATE: 16 BRPM | BODY MASS INDEX: 20.32 KG/M2 | HEART RATE: 98 BPM | WEIGHT: 110.44 LBS | HEIGHT: 62 IN | OXYGEN SATURATION: 98 %

## 2024-08-27 DIAGNOSIS — M54.42 ACUTE LEFT-SIDED LOW BACK PAIN WITH LEFT-SIDED SCIATICA: Primary | ICD-10-CM

## 2024-08-27 DIAGNOSIS — D50.9 IRON DEFICIENCY ANEMIA, UNSPECIFIED IRON DEFICIENCY ANEMIA TYPE: ICD-10-CM

## 2024-08-27 PROBLEM — Z76.89 ENCOUNTER TO ESTABLISH CARE WITH NEW DOCTOR: Status: RESOLVED | Noted: 2024-05-09 | Resolved: 2024-08-27

## 2024-08-27 PROCEDURE — 1160F RVW MEDS BY RX/DR IN RCRD: CPT | Mod: CPTII,S$GLB,, | Performed by: STUDENT IN AN ORGANIZED HEALTH CARE EDUCATION/TRAINING PROGRAM

## 2024-08-27 PROCEDURE — 1101F PT FALLS ASSESS-DOCD LE1/YR: CPT | Mod: CPTII,S$GLB,, | Performed by: STUDENT IN AN ORGANIZED HEALTH CARE EDUCATION/TRAINING PROGRAM

## 2024-08-27 PROCEDURE — 3008F BODY MASS INDEX DOCD: CPT | Mod: CPTII,S$GLB,, | Performed by: STUDENT IN AN ORGANIZED HEALTH CARE EDUCATION/TRAINING PROGRAM

## 2024-08-27 PROCEDURE — 99999 PR PBB SHADOW E&M-EST. PATIENT-LVL III: CPT | Mod: PBBFAC,,, | Performed by: STUDENT IN AN ORGANIZED HEALTH CARE EDUCATION/TRAINING PROGRAM

## 2024-08-27 PROCEDURE — 1125F AMNT PAIN NOTED PAIN PRSNT: CPT | Mod: CPTII,S$GLB,, | Performed by: STUDENT IN AN ORGANIZED HEALTH CARE EDUCATION/TRAINING PROGRAM

## 2024-08-27 PROCEDURE — 99214 OFFICE O/P EST MOD 30 MIN: CPT | Mod: S$GLB,,, | Performed by: STUDENT IN AN ORGANIZED HEALTH CARE EDUCATION/TRAINING PROGRAM

## 2024-08-27 PROCEDURE — 3288F FALL RISK ASSESSMENT DOCD: CPT | Mod: CPTII,S$GLB,, | Performed by: STUDENT IN AN ORGANIZED HEALTH CARE EDUCATION/TRAINING PROGRAM

## 2024-08-27 PROCEDURE — 1159F MED LIST DOCD IN RCRD: CPT | Mod: CPTII,S$GLB,, | Performed by: STUDENT IN AN ORGANIZED HEALTH CARE EDUCATION/TRAINING PROGRAM

## 2024-08-27 PROCEDURE — 3078F DIAST BP <80 MM HG: CPT | Mod: CPTII,S$GLB,, | Performed by: STUDENT IN AN ORGANIZED HEALTH CARE EDUCATION/TRAINING PROGRAM

## 2024-08-27 PROCEDURE — 3074F SYST BP LT 130 MM HG: CPT | Mod: CPTII,S$GLB,, | Performed by: STUDENT IN AN ORGANIZED HEALTH CARE EDUCATION/TRAINING PROGRAM

## 2024-08-27 RX ORDER — METHYLPREDNISOLONE 4 MG/1
TABLET ORAL
Qty: 1 EACH | Refills: 1 | Status: SHIPPED | OUTPATIENT
Start: 2024-08-27

## 2024-08-27 RX ORDER — GABAPENTIN 100 MG/1
100 CAPSULE ORAL 3 TIMES DAILY
Qty: 90 CAPSULE | Refills: 1 | Status: SHIPPED | OUTPATIENT
Start: 2024-08-27 | End: 2024-10-26

## 2024-08-27 NOTE — ASSESSMENT & PLAN NOTE
This is a chronic issue but having an acute flare up. Gabapentin helping. Asked pt to stretch, ice daily. Take ibuprofen as needed. Will prescribe gabapentin and also medrol dose samira. Will try PT. Ordered that today. She will save medrol dose samira for later.

## 2024-08-27 NOTE — ASSESSMENT & PLAN NOTE
I had started her on iron 5/2024. She is taking it every other day. Will check labs next visit including cbc and iron panel. Will monitor.

## 2024-08-27 NOTE — PROGRESS NOTES
Clinic Note  8/27/2024      Subjective:       Patient ID:  Melani is a 69 y.o. female being seen for an established visit.    Chief Complaint: Follow-up    HPI  Melani Brandt is a 69 y.o.  female who presents with sciatica of left leg, scoliosis, current tobacco use (12 cigs a day), trigger fingers, psoriasis (only skin is affected), hx of basal cell (3), squamous cell carcinoma(2) is here for a f/u visit. Did acp 2024  -her sciatica is bothering her. Left sided. Has been worse over the last 10 days. She has been taking gabapentin which helps. Not applying ice regularly. She is stretching but not as much. She is interested in PT.   -no falls   -refuses vaccines but UTD on screenings   -will do labs next time, will check iron panel then since she is taking iron      Review of Systems   Constitutional:  Negative for chills and fever.   HENT:  Negative for congestion.    Respiratory:  Negative for cough and shortness of breath.    Cardiovascular:  Negative for chest pain.   Gastrointestinal:  Negative for abdominal pain, blood in stool, constipation and diarrhea.   Genitourinary:  Negative for dysuria and hematuria.   Neurological:  Negative for dizziness and headaches.       Medication List with Changes/Refills   New Medications    GABAPENTIN (NEURONTIN) 100 MG CAPSULE    Take 1 capsule (100 mg total) by mouth 3 (three) times daily.    METHYLPREDNISOLONE (MEDROL DOSEPACK) 4 MG TABLET    use as directed   Current Medications    ALENDRONATE (FOSAMAX) 70 MG TABLET    Take 1 tablet (70 mg total) by mouth every 7 days. Take with a full glass of water & remain upright for at least 30 minutes     MG TABLET    Take 800 mg by mouth 3 (three) times daily.    KETOROLAC 0.5% (ACULAR) 0.5 % DROP    Place 1 drop into both eyes 4 (four) times daily.    PREDNISOLONE ACETATE (PRED FORTE) 1 % DRPS    Place 1 drop into both eyes 4 (four) times daily.           Objective:      /66 (BP Location: Left arm, Patient  "Position: Sitting, BP Method: Small (Manual))   Pulse 98   Resp 16   Ht 5' 2" (1.575 m)   Wt 50.1 kg (110 lb 7.2 oz)   SpO2 98%   BMI 20.20 kg/m²   Estimated body mass index is 20.2 kg/m² as calculated from the following:    Height as of this encounter: 5' 2" (1.575 m).    Weight as of this encounter: 50.1 kg (110 lb 7.2 oz).  Physical Exam  Constitutional:       Appearance: Normal appearance.   Eyes:      Conjunctiva/sclera: Conjunctivae normal.   Cardiovascular:      Rate and Rhythm: Normal rate and regular rhythm.      Heart sounds: Normal heart sounds.   Pulmonary:      Effort: Pulmonary effort is normal. No respiratory distress.      Breath sounds: Normal breath sounds.   Abdominal:      General: Bowel sounds are normal.   Musculoskeletal:      Right lower leg: No edema.      Left lower leg: Edema present.   Skin:     General: Skin is warm.   Neurological:      Mental Status: She is alert and oriented to person, place, and time.           Assessment and Plan:     1. Acute left-sided low back pain with left-sided sciatica  Assessment & Plan:  This is a chronic issue but having an acute flare up. Gabapentin helping. Asked pt to stretch, ice daily. Take ibuprofen as needed. Will prescribe gabapentin and also medrol dose samira. Will try PT. Ordered that today. She will save medrol dose samira for later.     Orders:  -     Ambulatory referral/consult to Physical/Occupational Therapy; Future; Expected date: 08/28/2024    2. Iron deficiency anemia, unspecified iron deficiency anemia type  Assessment & Plan:  I had started her on iron 5/2024. She is taking it every other day. Will check labs next visit including cbc and iron panel. Will monitor.       Other orders  -     methylPREDNISolone (MEDROL DOSEPACK) 4 mg tablet; use as directed  Dispense: 1 each; Refill: 1  -     gabapentin (NEURONTIN) 100 MG capsule; Take 1 capsule (100 mg total) by mouth 3 (three) times daily.  Dispense: 90 capsule; Refill: 1         Follow " Up:   Follow up in about 3 months (around 11/27/2024).    Other Orders Placed This Visit:  Orders Placed This Encounter   Procedures    Ambulatory referral/consult to Physical/Occupational Therapy         Bradley Robertson

## 2024-09-09 ENCOUNTER — CLINICAL SUPPORT (OUTPATIENT)
Dept: REHABILITATION | Facility: HOSPITAL | Age: 69
End: 2024-09-09
Attending: STUDENT IN AN ORGANIZED HEALTH CARE EDUCATION/TRAINING PROGRAM
Payer: MEDICARE

## 2024-09-09 DIAGNOSIS — R29.898 DECREASED STRENGTH OF LOWER EXTREMITY: ICD-10-CM

## 2024-09-09 DIAGNOSIS — M54.42 ACUTE LEFT-SIDED LOW BACK PAIN WITH LEFT-SIDED SCIATICA: ICD-10-CM

## 2024-09-09 DIAGNOSIS — R26.89 DECREASED FUNCTIONAL MOBILITY: ICD-10-CM

## 2024-09-09 DIAGNOSIS — M53.86 DECREASED RANGE OF MOTION OF LUMBAR SPINE: Primary | ICD-10-CM

## 2024-09-09 PROCEDURE — 97162 PT EVAL MOD COMPLEX 30 MIN: CPT

## 2024-09-09 PROCEDURE — 97110 THERAPEUTIC EXERCISES: CPT
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
## 2024-09-17 ENCOUNTER — CLINICAL SUPPORT (OUTPATIENT)
Dept: REHABILITATION | Facility: HOSPITAL | Age: 69
End: 2024-09-17
Payer: MEDICARE

## 2024-09-17 DIAGNOSIS — M53.86 DECREASED RANGE OF MOTION OF LUMBAR SPINE: Primary | ICD-10-CM

## 2024-09-17 DIAGNOSIS — R29.898 DECREASED STRENGTH OF LOWER EXTREMITY: ICD-10-CM

## 2024-09-17 DIAGNOSIS — R26.89 DECREASED FUNCTIONAL MOBILITY: ICD-10-CM

## 2024-09-17 PROCEDURE — 97112 NEUROMUSCULAR REEDUCATION: CPT

## 2024-09-17 PROCEDURE — 97140 MANUAL THERAPY 1/> REGIONS: CPT

## 2024-09-17 NOTE — PROGRESS NOTES
Physical Therapy Daily Treatment Note     Name: Melani Brandt  Clinic Number: [id number]    Therapy Diagnosis:   Encounter Diagnoses   Name Primary?    Decreased range of motion of lumbar spine Yes    Decreased functional mobility     Decreased strength of lower extremity      Physician: Bradley Robertson MD    Visit Date: 2024    Physician Orders: PT Eval and Treat   Medical Diagnosis from Referral: M54.42 (ICD-10-CM) - Acute left-sided low back pain with left-sided sciatica  Evaluation Date: 2024  Authorization Period Expiration: 2024  Plan of Care Expiration: 2024  Visit # / Visits authorized: 1/ 10     Time In: 10:00AM  Time Out: 11:00AM     Total Billable Time: 60 minutes     Precautions: Standard    Subjective     Pt reports: She's feeling about the same.  She was compliant with home exercise program.  Response to previous treatment: no adverse reactions  Functional change: none    Pain: 10  Location: left back      Objective   Posture:  increased knee flexion when standing straight up        Functional Movements:   Squat: right lateral shift         Standing Lumbar Range of Motion:     % Observation Pain   Flexion 100   no   Extension 50 Pivot point at lumbar no   Right Rotation 70   Yes, left side   Left Rotation 70   no   Right Sidebend 80   no   Left Sidebend 80   no            Hip Passive Range of Motion:     Right Left   Flexion 110 110   Internal Rotation 50 50   External Rotation 30 30            Lower Extremity Strength:    Right  Left    Quadriceps: 4+/5 4+/5   Hamstring at 90 de+/5 4+/5   Iliopsoas (sitting): 4/5 2/5   Hip IR:  4/5 4/5   Hip ER 4/5 4/5              Right  Left    HARRY - +      Jt mobility: hypomobile hip, hypomobile T spine.     Neural Tension Testing:   Slump:negative SEMAJ  SLR: Positive on L        Melani received therapeutic exercises to develop strength, endurance, ROM, flexibility, posture, and core stabilization for 0 minutes  including:        Melani received the following manual therapy techniques: Joint mobilizations, Manual traction, Myofacial release, Manual Lymphatic Drainage, Soft tissue Mobilization, and Friction Massage were applied to the: lumbar for 8 minutes, including:  Hip distraction mobs w/ belt  Prone grade III/IV mobs PA T spine    Melani participated in neuromuscular re-education activities to improve: Balance, Coordination, Kinesthetic, Sense, Proprioception, and Posture for 52 minutes. The following activities were included:  Hip and thoracic spine assessment  Supine sciatic nerve glides 2x20  SL clams GTB 3x8 *pain and discontinued  Double knees to chest 10 sec 10x  Bent knee fall outs 3x10  Bridges 3x10    Melani participated in dynamic functional therapeutic activities to improve functional performance for 0  minutes, including:          Home Exercises Provided and Patient Education Provided     Education provided:   - Pt educated on HEP and POC    Written Home Exercises Provided: yes.  Exercises were reviewed and Melani was able to demonstrate them prior to the end of the session.  Melani demonstrated good  understanding of the education provided.     See EMR under Patient Instructions for exercises provided 9/17/2024.    Assessment     Pt presents with min improvement of symptoms since IE. She didn't take her gabapentin this morning. Attempted to add SL clams today but discontinued due to reproduction of symptoms. Assessed hip and thoracic spine mobility today, and the patient demonstrated hypomobility. Added manual joint mobs to address deficits. Progressed core activation today with good tolerance. Pt required min verbal cues for TA activation. Will continue to progress as tolerated.    Melani Is progressing well towards her goals.   Pt prognosis is Fair.     Pt will continue to benefit from skilled outpatient physical therapy to address the deficits listed in the problem list box on initial evaluation,  provide pt/family education and to maximize pt's level of independence in the home and community environment.     Pt's spiritual, cultural and educational needs considered and pt agreeable to plan of care and goals.     Anticipated barriers to physical therapy: none    Goals: Short Term Goals (4 Weeks):  1. Patient will demonstrate proper performance of home exercise program of active exercises to improve carryover between sessions.  2. The patient will perform transverse abdominis contraction isomerically for 5 seconds to improve spinal stability.  3. The patient will demonstrate increased lumbar AROM by 10  degrees in order to improve the patient's ability to lift objects from floor.  4. The patient will perform a forward bend with proper muscle sequencing in order to avoid overuse of the lumbar paraspinals when returning to flexed trunk position.  5. The patient will demonstrate an increase in lower extremity strength by 1/2 MMT grade in order to allow her ability to walk without hip drop.  6. The patient will report a decrease in pain level from 7/10 at worse to 0/10.   7. The patient will demonstrate the ability to stand for 30 minutes in order to perform bathing and grooming tasks independently.     Long Term Goals (8 Weeks):  Patient will be independent with all home exercises and progressions for management of symptoms.  Patient will improve FOTO score to </= see FOTO% limited to decrease perceived limitation with mobility.  3. The patient will perform a transverse abdominis contraction while performing dynamic LE/UE movements with good control to demonstrate improved spinal stability.   4. The patient will demonstrate the ability to carry 5# from multiple surface heights without experiencing impingement or shooting leg pain.   5. The patient will demonstrate increased strength of LE to >/= 4+/5 by discharge in order to return to functional activities.  6. The patient will complete 10 pain free repetitions  of a functional squat with 0# in order to improve her ability to get in and out of a chair          Plan     Plan of care Certification: 9/9/2024 to 12/2/2024.     Outpatient Physical Therapy 1-2 times weekly for 10-12 weeks to include the following interventions: Manual Therapy, Moist Heat/ Ice, Neuromuscular Re-ed, Patient Education, Self Care, Therapeutic Activities, and Therapeutic Exercise.     Joel Bloom, PT

## 2024-09-23 ENCOUNTER — CLINICAL SUPPORT (OUTPATIENT)
Dept: REHABILITATION | Facility: HOSPITAL | Age: 69
End: 2024-09-23
Payer: MEDICARE

## 2024-09-23 DIAGNOSIS — R29.898 DECREASED STRENGTH OF LOWER EXTREMITY: ICD-10-CM

## 2024-09-23 DIAGNOSIS — M53.86 DECREASED RANGE OF MOTION OF LUMBAR SPINE: Primary | ICD-10-CM

## 2024-09-23 DIAGNOSIS — R26.89 DECREASED FUNCTIONAL MOBILITY: ICD-10-CM

## 2024-09-23 PROCEDURE — 97530 THERAPEUTIC ACTIVITIES: CPT

## 2024-09-23 PROCEDURE — 97112 NEUROMUSCULAR REEDUCATION: CPT

## 2024-09-23 NOTE — PROGRESS NOTES
Physical Therapy Daily Treatment Note     Name: Melani Brandt  Clinic Number: [id number]    Therapy Diagnosis:   Encounter Diagnoses   Name Primary?    Decreased range of motion of lumbar spine Yes    Decreased functional mobility     Decreased strength of lower extremity        Physician: Bradley Robertson MD    Visit Date: 2024    Physician Orders: PT Eval and Treat   Medical Diagnosis from Referral: M54.42 (ICD-10-CM) - Acute left-sided low back pain with left-sided sciatica  Evaluation Date: 2024  Authorization Period Expiration: 2024  Plan of Care Expiration: 2024  Visit # / Visits authorized: 2/ 10     Time In: 11:00AM  Time Out: 12:00PM     Total Billable Time: 60 minutes     Precautions: Standard    Subjective     Pt reports: She's feeling pretty good today. She didn't do any lawn work over the weekend.  She was compliant with home exercise program.  Response to previous treatment: no adverse reactions  Functional change: none    Pain: 2/10  Location: left back      Objective   Posture:  increased knee flexion when standing straight up        Functional Movements:   Squat: right lateral shift         Standing Lumbar Range of Motion:     % Observation Pain   Flexion 100   no   Extension 75 Pivot point at lumbar no   Right Rotation 70   Yes, left side   Left Rotation 70   no   Right Sidebend 80   no   Left Sidebend 80   no            Hip Passive Range of Motion:     Right Left   Flexion 110 110   Internal Rotation 50 50   External Rotation 30 30            Lower Extremity Strength:    Right  Left    Quadriceps: 4+/5 4+/5   Hamstring at 90 de+/5 4+/5   Iliopsoas (sitting): 4/5 2/5   Hip IR:  4/5 4/5   Hip ER 4/5 4/5              Right  Left    HARRY - +      Jt mobility: hypomobile hip, hypomobile T spine.     Neural Tension Testing:   Slump:negative SEMAJ  SLR: Positive on L        Melani received therapeutic exercises to develop strength, endurance, ROM, flexibility, posture, and  core stabilization for 0 minutes including:        Melani received the following manual therapy techniques: Joint mobilizations, Manual traction, Myofacial release, Manual Lymphatic Drainage, Soft tissue Mobilization, and Friction Massage were applied to the: lumbar for 0 minutes, including:  Hip distraction mobs w/ belt  Prone grade III/IV mobs PA T spine    Melani participated in neuromuscular re-education activities to improve: Balance, Coordination, Kinesthetic, Sense, Proprioception, and Posture for 52 minutes. The following activities were included:    Supine sciatic nerve glides 2x20  Bent knee fall outs 3x10  Bridges 3x12  Bird dogs LE movement 3x12  Lateral band walks GTB 3 laps- min symptoms noted w/ delayed onset afterwards    Melani participated in dynamic functional therapeutic activities to improve functional performance for 8  minutes, including:  Hip hinging w/ dowel in front of mirror 3x10        Home Exercises Provided and Patient Education Provided     Education provided:   - Pt educated on HEP and POC    Written Home Exercises Provided: yes.  Exercises were reviewed and Melani was able to demonstrate them prior to the end of the session.  Melani demonstrated good  understanding of the education provided.     See EMR under Patient Instructions for exercises provided 9/17/2024.    Assessment     Pt presents with significant improvement in symptoms since previous visit. Progressed core activation today with good tolerance. Added birddogs today with good tolerance and mod verbal cues for neutral/stable pelvis. Added hip hinging w/ dowel to mimick functional task(picking up dog food) w/ verbal cues for TA activation. Pt required min verbal cues for TA activation. Will continue to progress as tolerated.    Melani Is progressing well towards her goals.   Pt prognosis is Fair.     Pt will continue to benefit from skilled outpatient physical therapy to address the deficits listed in the problem list  box on initial evaluation, provide pt/family education and to maximize pt's level of independence in the home and community environment.     Pt's spiritual, cultural and educational needs considered and pt agreeable to plan of care and goals.     Anticipated barriers to physical therapy: none    Goals: Short Term Goals (4 Weeks):  1. Patient will demonstrate proper performance of home exercise program of active exercises to improve carryover between sessions.  2. The patient will perform transverse abdominis contraction isomerically for 5 seconds to improve spinal stability.  3. The patient will demonstrate increased lumbar AROM by 10  degrees in order to improve the patient's ability to lift objects from floor.  4. The patient will perform a forward bend with proper muscle sequencing in order to avoid overuse of the lumbar paraspinals when returning to flexed trunk position.  5. The patient will demonstrate an increase in lower extremity strength by 1/2 MMT grade in order to allow her ability to walk without hip drop.  6. The patient will report a decrease in pain level from 7/10 at worse to 0/10.   7. The patient will demonstrate the ability to stand for 30 minutes in order to perform bathing and grooming tasks independently.     Long Term Goals (8 Weeks):  Patient will be independent with all home exercises and progressions for management of symptoms.  Patient will improve FOTO score to </= see FOTO% limited to decrease perceived limitation with mobility.  3. The patient will perform a transverse abdominis contraction while performing dynamic LE/UE movements with good control to demonstrate improved spinal stability.   4. The patient will demonstrate the ability to carry 5# from multiple surface heights without experiencing impingement or shooting leg pain.   5. The patient will demonstrate increased strength of LE to >/= 4+/5 by discharge in order to return to functional activities.  6. The patient will complete  10 pain free repetitions of a functional squat with 0# in order to improve her ability to get in and out of a chair          Plan     Plan of care Certification: 9/9/2024 to 12/2/2024.     Outpatient Physical Therapy 1-2 times weekly for 10-12 weeks to include the following interventions: Manual Therapy, Moist Heat/ Ice, Neuromuscular Re-ed, Patient Education, Self Care, Therapeutic Activities, and Therapeutic Exercise.     Joel Bloom, PT

## 2024-10-02 ENCOUNTER — CLINICAL SUPPORT (OUTPATIENT)
Dept: REHABILITATION | Facility: HOSPITAL | Age: 69
End: 2024-10-02
Payer: MEDICARE

## 2024-10-02 DIAGNOSIS — R29.898 DECREASED STRENGTH OF LOWER EXTREMITY: ICD-10-CM

## 2024-10-02 DIAGNOSIS — M53.86 DECREASED RANGE OF MOTION OF LUMBAR SPINE: Primary | ICD-10-CM

## 2024-10-02 DIAGNOSIS — R26.89 DECREASED FUNCTIONAL MOBILITY: ICD-10-CM

## 2024-10-02 PROCEDURE — 97112 NEUROMUSCULAR REEDUCATION: CPT | Mod: KX

## 2024-10-02 PROCEDURE — 97110 THERAPEUTIC EXERCISES: CPT | Mod: KX

## 2024-10-02 NOTE — PROGRESS NOTES
"  Physical Therapy Daily Treatment Note     Name: Melani Brandt  Clinic Number: [id number]    Therapy Diagnosis:   Encounter Diagnoses   Name Primary?    Decreased range of motion of lumbar spine Yes    Decreased functional mobility     Decreased strength of lower extremity          Physician: Bradley Robertson MD    Visit Date: 10/2/2024    Physician Orders: PT Eval and Treat   Medical Diagnosis from Referral: M54.42 (ICD-10-CM) - Acute left-sided low back pain with left-sided sciatica  Evaluation Date: 2024  Authorization Period Expiration: 2024  Plan of Care Expiration: 2024  Visit # / Visits authorized: 3/ 10     Time In: 10:00AM  Time Out: 11:00AM     Total Billable Time: 60 minutes     Precautions: Standard    Subjective     Pt reports: She's feeling pretty good today. She didn't do any lawn work over the weekend.  She was compliant with home exercise program.  Response to previous treatment: no adverse reactions  Functional change: none    Pain: 2/10  Location: left back      Objective   Posture:  increased knee flexion when standing straight up        Functional Movements:   Squat: right lateral shift         Standing Lumbar Range of Motion: 10/2/2024    % Observation Pain   Flexion 100   no   Extension 80 Pivot point at lumbar no   Right Rotation 90   "Doesn't increase pain but she feels it"   Left Rotation 90   no   Right Sidebend 80   no   Left Sidebend 80   no            Hip Passive Range of Motion:     Right Left   Flexion 110 110   Internal Rotation 50 50   External Rotation 30 30            Lower Extremity Strength:    Right  Left    Quadriceps: 4+/5 4+/5   Hamstring at 90 de+/5 4+/5   Iliopsoas (sitting): 4/5 2/5   Hip IR:  4/5 4/5   Hip ER 4/5 4/5              Right  Left    HARRY - +      Jt mobility: hypomobile hip, hypomobile T spine.     Neural Tension Testing:   Slump:negative SEMAJ  SLR: Positive on L        Melani received therapeutic exercises to develop strength, " endurance, ROM, flexibility, posture, and core stabilization for 10 minutes including:  Bike 8 min for tissue extensibility and pain modulation  Standing wall T spine rotations 20x SEMAJ      Melani received the following manual therapy techniques: Joint mobilizations, Manual traction, Myofacial release, Manual Lymphatic Drainage, Soft tissue Mobilization, and Friction Massage were applied to the: lumbar for 0 minutes, including:  Hip distraction mobs w/ belt  Prone grade III/IV mobs PA T spine    Melani participated in neuromuscular re-education activities to improve: Balance, Coordination, Kinesthetic, Sense, Proprioception, and Posture for 50 minutes. The following activities were included:    Supine physioball bracing 10 sec holds 10x  Supine marches w/ physioball bracing 3x8  Supine sciatic nerve glides 2x20  Bent knee fall outs 3x10  Bridges 2 up 1 down 3x8    Not today:  Bird dogs LE movement 3x12  Lateral band walks GTB 3 laps- min symptoms noted w/ delayed onset afterwards    Melani participated in dynamic functional therapeutic activities to improve functional performance for 0  minutes, including:      NT:Hip hinging w/ dowel in front of mirror 3x10        Home Exercises Provided and Patient Education Provided     Education provided:   - Pt educated on HEP and POC    Written Home Exercises Provided: yes.  Exercises were reviewed and Melani was able to demonstrate them prior to the end of the session.  Melani demonstrated good  understanding of the education provided.     See EMR under Patient Instructions for exercises provided 9/17/2024.    Assessment     Pt presents with improved lumbar ROM since previous visit. Intro'd Bike for tissue extensibility and pain modulation today with good tolerance. Progressed core and hip activation today with good tolerance. Pt reported min hip discomfort with supine marches, but the discomfort dissipated after completion of the exercise. Modified T spine rotations to  standing at wall due to patient's discomfort laying on her side. Pt required mod verbal cues during bird dogs for neutral pelvis positioning. Will continue to progress as tolerated.    Melani Is progressing well towards her goals.   Pt prognosis is Fair.     Pt will continue to benefit from skilled outpatient physical therapy to address the deficits listed in the problem list box on initial evaluation, provide pt/family education and to maximize pt's level of independence in the home and community environment.     Pt's spiritual, cultural and educational needs considered and pt agreeable to plan of care and goals.     Anticipated barriers to physical therapy: none    Goals: Short Term Goals (4 Weeks):  1. Patient will demonstrate proper performance of home exercise program of active exercises to improve carryover between sessions.  2. The patient will perform transverse abdominis contraction isomerically for 5 seconds to improve spinal stability.  3. The patient will demonstrate increased lumbar AROM by 10  degrees in order to improve the patient's ability to lift objects from floor.  4. The patient will perform a forward bend with proper muscle sequencing in order to avoid overuse of the lumbar paraspinals when returning to flexed trunk position.  5. The patient will demonstrate an increase in lower extremity strength by 1/2 MMT grade in order to allow her ability to walk without hip drop.  6. The patient will report a decrease in pain level from 7/10 at worse to 0/10.   7. The patient will demonstrate the ability to stand for 30 minutes in order to perform bathing and grooming tasks independently.     Long Term Goals (8 Weeks):  Patient will be independent with all home exercises and progressions for management of symptoms.  Patient will improve FOTO score to </= see FOTO% limited to decrease perceived limitation with mobility.  3. The patient will perform a transverse abdominis contraction while performing  dynamic LE/UE movements with good control to demonstrate improved spinal stability.   4. The patient will demonstrate the ability to carry 5# from multiple surface heights without experiencing impingement or shooting leg pain.   5. The patient will demonstrate increased strength of LE to >/= 4+/5 by discharge in order to return to functional activities.  6. The patient will complete 10 pain free repetitions of a functional squat with 0# in order to improve her ability to get in and out of a chair          Plan     Plan of care Certification: 9/9/2024 to 12/2/2024.     Outpatient Physical Therapy 1-2 times weekly for 10-12 weeks to include the following interventions: Manual Therapy, Moist Heat/ Ice, Neuromuscular Re-ed, Patient Education, Self Care, Therapeutic Activities, and Therapeutic Exercise.     Joel Bloom, PT

## 2024-10-09 ENCOUNTER — CLINICAL SUPPORT (OUTPATIENT)
Dept: REHABILITATION | Facility: HOSPITAL | Age: 69
End: 2024-10-09
Payer: MEDICARE

## 2024-10-09 DIAGNOSIS — R26.89 DECREASED FUNCTIONAL MOBILITY: ICD-10-CM

## 2024-10-09 DIAGNOSIS — R29.898 DECREASED STRENGTH OF LOWER EXTREMITY: ICD-10-CM

## 2024-10-09 DIAGNOSIS — M53.86 DECREASED RANGE OF MOTION OF LUMBAR SPINE: Primary | ICD-10-CM

## 2024-10-09 PROCEDURE — 97112 NEUROMUSCULAR REEDUCATION: CPT

## 2024-10-09 PROCEDURE — 97110 THERAPEUTIC EXERCISES: CPT

## 2024-10-09 NOTE — PROGRESS NOTES
"  Physical Therapy Daily Treatment Note     Name: Melani Brandt  Clinic Number: [id number]    Therapy Diagnosis:   Encounter Diagnoses   Name Primary?    Decreased range of motion of lumbar spine Yes    Decreased functional mobility     Decreased strength of lower extremity            Physician: Bradley Robertson MD    Visit Date: 10/9/2024    Physician Orders: PT Eval and Treat   Medical Diagnosis from Referral: M54.42 (ICD-10-CM) - Acute left-sided low back pain with left-sided sciatica  Evaluation Date: 2024  Authorization Period Expiration: 2024  Plan of Care Expiration: 2024  Visit # / Visits authorized: 4/ 10     Time In: 4:30PM  Time Out: 5:30PM     Total Billable Time: 60 minutes     Precautions: Standard    Subjective     Pt reports: She's feeling pretty good today. About the same  She was compliant with home exercise program.  Response to previous treatment: no adverse reactions  Functional change: none    Pain: 2/10  Location: left back      Objective   Posture:  increased knee flexion when standing straight up        Functional Movements:   Squat: right lateral shift         Standing Lumbar Range of Motion: 10/2/2024    % Observation Pain   Flexion 100   no   Extension 80 Pivot point at lumbar no   Right Rotation 90   "Doesn't increase pain but she feels it"   Left Rotation 90   no   Right Sidebend 80   no   Left Sidebend 80   no            Hip Passive Range of Motion:     Right Left   Flexion 110 110   Internal Rotation 50 50   External Rotation 30 30            Lower Extremity Strength:    Right  Left    Quadriceps: 4+/5 4+/5   Hamstring at 90 de+/5 4+/5   Iliopsoas (sitting): 4/5 2/5   Hip IR:  4/5 4/5   Hip ER 4/5 4/5              Right  Left    HARRY - +      Jt mobility: hypomobile hip, hypomobile T spine.     Neural Tension Testing:   Slump:negative SEMAJ  SLR: Positive on L        Melani received therapeutic exercises to develop strength, endurance, ROM, flexibility, posture, " and core stabilization for 30 minutes including:  Bike 8 min for tissue extensibility and pain modulation  Standing wall T spine rotations 20x SEMAJ  Single leg bridges 3x8    Melani received the following manual therapy techniques: Joint mobilizations, Manual traction, Myofacial release, Manual Lymphatic Drainage, Soft tissue Mobilization, and Friction Massage were applied to the: lumbar for 0 minutes, including:  Hip distraction mobs w/ belt  Prone grade III/IV mobs PA T spine    Melani participated in neuromuscular re-education activities to improve: Balance, Coordination, Kinesthetic, Sense, Proprioception, and Posture for 30 minutes. The following activities were included:    Supine marches w/ physioball bracing 3x8  Supine sciatic nerve glides 2x20  Bent knee fall outs 3x10      Not today:  Bird dogs LE movement 3x12  Lateral band walks GTB 3 laps- min symptoms noted w/ delayed onset afterwards    Melani participated in dynamic functional therapeutic activities to improve functional performance for 0  minutes, including:      NT:Hip hinging w/ dowel in front of mirror 3x10        Home Exercises Provided and Patient Education Provided     Education provided:   - Pt educated on HEP and POC    Written Home Exercises Provided: yes.  Exercises were reviewed and Melani was able to demonstrate them prior to the end of the session.  Melani demonstrated good  understanding of the education provided.     See EMR under Patient Instructions for exercises provided 9/17/2024.    Assessment     Pt completed today's treatment listed above. Progressed core and hip activation today with good tolerance. Pt reported improved tolerance with supine hip marches since previous visit. Pt required mod verbal cues during bent knee fall outs for eccentric control. Will continue to progress as tolerated.    Melani Is progressing well towards her goals.   Pt prognosis is Fair.     Pt will continue to benefit from skilled outpatient  physical therapy to address the deficits listed in the problem list box on initial evaluation, provide pt/family education and to maximize pt's level of independence in the home and community environment.     Pt's spiritual, cultural and educational needs considered and pt agreeable to plan of care and goals.     Anticipated barriers to physical therapy: none    Goals: Short Term Goals (4 Weeks):  1. Patient will demonstrate proper performance of home exercise program of active exercises to improve carryover between sessions.  2. The patient will perform transverse abdominis contraction isomerically for 5 seconds to improve spinal stability.  3. The patient will demonstrate increased lumbar AROM by 10  degrees in order to improve the patient's ability to lift objects from floor.  4. The patient will perform a forward bend with proper muscle sequencing in order to avoid overuse of the lumbar paraspinals when returning to flexed trunk position.  5. The patient will demonstrate an increase in lower extremity strength by 1/2 MMT grade in order to allow her ability to walk without hip drop.  6. The patient will report a decrease in pain level from 7/10 at worse to 0/10.   7. The patient will demonstrate the ability to stand for 30 minutes in order to perform bathing and grooming tasks independently.     Long Term Goals (8 Weeks):  Patient will be independent with all home exercises and progressions for management of symptoms.  Patient will improve FOTO score to </= see FOTO% limited to decrease perceived limitation with mobility.  3. The patient will perform a transverse abdominis contraction while performing dynamic LE/UE movements with good control to demonstrate improved spinal stability.   4. The patient will demonstrate the ability to carry 5# from multiple surface heights without experiencing impingement or shooting leg pain.   5. The patient will demonstrate increased strength of LE to >/= 4+/5 by discharge in  order to return to functional activities.  6. The patient will complete 10 pain free repetitions of a functional squat with 0# in order to improve her ability to get in and out of a chair          Plan     Plan of care Certification: 9/9/2024 to 12/2/2024.     Outpatient Physical Therapy 1-2 times weekly for 10-12 weeks to include the following interventions: Manual Therapy, Moist Heat/ Ice, Neuromuscular Re-ed, Patient Education, Self Care, Therapeutic Activities, and Therapeutic Exercise.     Joel Bloom, PT

## 2024-10-22 ENCOUNTER — CLINICAL SUPPORT (OUTPATIENT)
Dept: REHABILITATION | Facility: HOSPITAL | Age: 69
End: 2024-10-22
Payer: MEDICARE

## 2024-10-22 DIAGNOSIS — M53.86 DECREASED RANGE OF MOTION OF LUMBAR SPINE: Primary | ICD-10-CM

## 2024-10-22 DIAGNOSIS — R29.898 DECREASED STRENGTH OF LOWER EXTREMITY: ICD-10-CM

## 2024-10-22 DIAGNOSIS — R26.89 DECREASED FUNCTIONAL MOBILITY: ICD-10-CM

## 2024-10-22 PROCEDURE — 97140 MANUAL THERAPY 1/> REGIONS: CPT

## 2024-10-22 PROCEDURE — 97112 NEUROMUSCULAR REEDUCATION: CPT

## 2024-10-22 PROCEDURE — 97110 THERAPEUTIC EXERCISES: CPT

## 2024-10-22 NOTE — PROGRESS NOTES
"  Physical Therapy Daily Treatment Note     Name: Melani Brandt  Clinic Number: [id number]    Therapy Diagnosis:   Encounter Diagnoses   Name Primary?    Decreased range of motion of lumbar spine Yes    Decreased functional mobility     Decreased strength of lower extremity              Physician: Bradley Robertson MD    Visit Date: 10/22/2024    Physician Orders: PT Eval and Treat   Medical Diagnosis from Referral: M54.42 (ICD-10-CM) - Acute left-sided low back pain with left-sided sciatica  Evaluation Date: 2024  Authorization Period Expiration: 2024  Plan of Care Expiration: 2024  Visit # / Visits authorized: 5/ 10   Follow UP FOTO: 10/22    Time In: 11:00AM  Time Out: 11:55AM     Total Billable Time: 55 minutes     Precautions: Standard    Subjective     Pt reports: Pt feeling worse today from her week long hiking trip in [facility]  She was compliant with home exercise program.  Response to previous treatment: no adverse reactions  Functional change: none    Pain: 2/10  Location: left back      Objective   Posture:  increased knee flexion when standing straight up        Functional Movements:   Squat: right lateral shift         Standing Lumbar Range of Motion: 10/2/2024    % Observation Pain   Flexion 100   no   Extension 80 Pivot point at lumbar no   Right Rotation 90   "Doesn't increase pain but she feels it"   Left Rotation 90   no   Right Sidebend 80   no   Left Sidebend 80   no            Hip Passive Range of Motion:     Right Left   Flexion 110 110   Internal Rotation 50 50   External Rotation 30 30            Lower Extremity Strength:    Right  Left    Quadriceps: 4+/5 4+/5   Hamstring at 90 de+/5 4+/5   Iliopsoas (sitting): 4/5 2/5   Hip IR:  4/5 4/5   Hip ER 4/5 4/5              Right  Left    HARRY - +      Jt mobility: hypomobile hip, hypomobile T spine.     Neural Tension Testing:   Slump:negative SEMAJ  SLR: Positive on L        Melani received therapeutic exercises to develop " strength, endurance, ROM, flexibility, posture, and core stabilization for 15 minutes including:  Bike 8 min for tissue extensibility and pain modulation  Standing wall T spine rotations 25x SEMAJ      Melani received the following manual therapy techniques: Joint mobilizations, Manual traction, Myofacial release, Manual Lymphatic Drainage, Soft tissue Mobilization, and Friction Massage were applied to the: lumbar for 10 minutes, including:  Supine hip distraction oscillations      Melani participated in neuromuscular re-education activities to improve: Balance, Coordination, Kinesthetic, Sense, Proprioception, and Posture for 30 minutes. The following activities were included:    Sidelying clams 3x15 w/ manual lumbar distraction  Supine sciatic nerve glides 2x25  Bent knee fall outs 3x15      Not today:  Bird dogs LE movement 3x12  Lateral band walks GTB 3 laps- min symptoms noted w/ delayed onset afterwards    Melani participated in dynamic functional therapeutic activities to improve functional performance for 0  minutes, including:      NT:Hip hinging w/ dowel in front of mirror 3x10        Home Exercises Provided and Patient Education Provided     Education provided:   - Pt educated on HEP and POC    Written Home Exercises Provided: yes.  Exercises were reviewed and Melani was able to demonstrate them prior to the end of the session.  Melani demonstrated good  understanding of the education provided.     See EMR under Patient Instructions for exercises provided 9/17/2024.    Assessment     Pt presented with overall increased irritability 2/2 to her week like hiking trip in [facility]. Reintroduced sidelying clams today. Pt continues to have shooting pain down her leg with sidelying position, but symptoms are abolished with a manual lumbar distraction during the exercise. Symptoms also improved with manual hip distraction oscillations. Will continue to progress as tolerated.    Melani Is progressing well  towards her goals.   Pt prognosis is Fair.     Pt will continue to benefit from skilled outpatient physical therapy to address the deficits listed in the problem list box on initial evaluation, provide pt/family education and to maximize pt's level of independence in the home and community environment.     Pt's spiritual, cultural and educational needs considered and pt agreeable to plan of care and goals.     Anticipated barriers to physical therapy: none    Goals: Short Term Goals (4 Weeks):  1. Patient will demonstrate proper performance of home exercise program of active exercises to improve carryover between sessions.  2. The patient will perform transverse abdominis contraction isomerically for 5 seconds to improve spinal stability.  3. The patient will demonstrate increased lumbar AROM by 10  degrees in order to improve the patient's ability to lift objects from floor.  4. The patient will perform a forward bend with proper muscle sequencing in order to avoid overuse of the lumbar paraspinals when returning to flexed trunk position.  5. The patient will demonstrate an increase in lower extremity strength by 1/2 MMT grade in order to allow her ability to walk without hip drop.  6. The patient will report a decrease in pain level from 7/10 at worse to 0/10.   7. The patient will demonstrate the ability to stand for 30 minutes in order to perform bathing and grooming tasks independently.     Long Term Goals (8 Weeks):  Patient will be independent with all home exercises and progressions for management of symptoms.  Patient will improve FOTO score to </= see FOTO% limited to decrease perceived limitation with mobility.  3. The patient will perform a transverse abdominis contraction while performing dynamic LE/UE movements with good control to demonstrate improved spinal stability.   4. The patient will demonstrate the ability to carry 5# from multiple surface heights without experiencing impingement or shooting  leg pain.   5. The patient will demonstrate increased strength of LE to >/= 4+/5 by discharge in order to return to functional activities.  6. The patient will complete 10 pain free repetitions of a functional squat with 0# in order to improve her ability to get in and out of a chair          Plan     Plan of care Certification: 9/9/2024 to 12/2/2024.     Outpatient Physical Therapy 1-2 times weekly for 10-12 weeks to include the following interventions: Manual Therapy, Moist Heat/ Ice, Neuromuscular Re-ed, Patient Education, Self Care, Therapeutic Activities, and Therapeutic Exercise.     Joel Bloom, PT

## 2024-10-31 ENCOUNTER — CLINICAL SUPPORT (OUTPATIENT)
Dept: REHABILITATION | Facility: HOSPITAL | Age: 69
End: 2024-10-31
Payer: MEDICARE

## 2024-10-31 ENCOUNTER — PATIENT MESSAGE (OUTPATIENT)
Dept: PRIMARY CARE CLINIC | Facility: CLINIC | Age: 69
End: 2024-10-31
Payer: MEDICARE

## 2024-10-31 DIAGNOSIS — R29.898 DECREASED STRENGTH OF LOWER EXTREMITY: ICD-10-CM

## 2024-10-31 DIAGNOSIS — M53.86 DECREASED RANGE OF MOTION OF LUMBAR SPINE: Primary | ICD-10-CM

## 2024-10-31 DIAGNOSIS — R26.89 DECREASED FUNCTIONAL MOBILITY: ICD-10-CM

## 2024-10-31 PROCEDURE — 97112 NEUROMUSCULAR REEDUCATION: CPT

## 2024-10-31 PROCEDURE — 97110 THERAPEUTIC EXERCISES: CPT

## 2024-11-07 ENCOUNTER — CLINICAL SUPPORT (OUTPATIENT)
Dept: REHABILITATION | Facility: HOSPITAL | Age: 69
End: 2024-11-07
Payer: MEDICARE

## 2024-11-07 DIAGNOSIS — R29.898 DECREASED STRENGTH OF LOWER EXTREMITY: ICD-10-CM

## 2024-11-07 DIAGNOSIS — M53.86 DECREASED RANGE OF MOTION OF LUMBAR SPINE: Primary | ICD-10-CM

## 2024-11-07 DIAGNOSIS — R26.89 DECREASED FUNCTIONAL MOBILITY: ICD-10-CM

## 2024-11-07 PROCEDURE — 97112 NEUROMUSCULAR REEDUCATION: CPT

## 2024-11-07 PROCEDURE — 97110 THERAPEUTIC EXERCISES: CPT

## 2024-11-07 PROCEDURE — 97530 THERAPEUTIC ACTIVITIES: CPT

## 2024-11-07 NOTE — PROGRESS NOTES
Physical Therapy Daily Treatment Note     Name: Melani Brandt  Clinic Number: [id number]    Therapy Diagnosis:   Encounter Diagnoses   Name Primary?    Decreased range of motion of lumbar spine Yes    Decreased functional mobility     Decreased strength of lower extremity                  Physician: Bradley Robertson MD    Visit Date: 11/7/2024    Physician Orders: PT Eval and Treat   Medical Diagnosis from Referral: M54.42 (ICD-10-CM) - Acute left-sided low back pain with left-sided sciatica  Evaluation Date: 9/9/2024  Authorization Period Expiration: 12/31/2024  Plan of Care Expiration: 12/2/2024  Visit # / Visits authorized: 7/ 10   Follow UP FOTO: 10/22     Time In: 2:00 PM  Time Out: 3:00 PM     Total Billable Time: 55 minutes     Precautions: Standard    Subjective     Pt reports: Pt feeing better since her trip to [facility]  She was compliant with home exercise program.  Response to previous treatment: no adverse reactions  Functional change: none    Pain: not verbalized/10  Location: left back      Objective   Objectives updated at progress note unless otherwise noted    Muscle Length Testing:  Psoas- R-positive    L negative     Adverse Neural Tension Testing:  Peroneol Nerve- L- positive R- negative      Melani received therapeutic exercises to develop strength, endurance, ROM, flexibility, posture, and core stabilization for 15 minutes including:  Bike 8 min for tissue extensibility and pain modulation  Prone quad stretch 30 seconds 5x    Melani received the following manual therapy techniques: Joint mobilizations, Manual traction, Myofacial release, Manual Lymphatic Drainage, Soft tissue Mobilization, and Friction Massage were applied to the: lumbar for 0 minutes, including:  Supine hip distraction oscillations      Melani participated in neuromuscular re-education activities to improve: Balance, Coordination, Kinesthetic, Sense, Proprioception, and Posture for 25 minutes. The following activities  were included:  Standing wall T spine rotations (cueing for core activation) 25x SEMAJ  Bridges 3x10  Supine peroneal nerve glides 2x20        Not today:  Bird dogs LE movement 3x12  Lateral band walks GTB 3 laps- min symptoms noted w/ delayed onset afterwards    Melani participated in dynamic functional therapeutic activities to improve functional performance for 20  minutes, including:    Hip hinging w/ dowel seated on box 2x15  Suitcase carries 5# 1 lap, 10# 1 lap, 15# 2 laps (4 laps total)        Home Exercises Provided and Patient Education Provided     Education provided:   - Pt educated on HEP and POC    Written Home Exercises Provided: yes.  Exercises were reviewed and Melani was able to demonstrate them prior to the end of the session.  Melani demonstrated good  understanding of the education provided.     See EMR under Patient Instructions for exercises provided 9/17/2024.    Assessment     Pt presented with increased irritability 2/2 to performing house chores around her house for the last 3 days. Of note she reports that she wasn't able to perform any house chores at all prior to beginning physical therapy. Pt responded well to interventions with resting pain decreased from 9/10 pre session to 4/10 post session. Continued with progressive core activation in functional movement patterns today with good tolerance and appropriate muscle activation. Pt required mod verbal cues for neurtral spine during hip hinges. Will continue to progress as tolerated.    Melani Is progressing well towards her goals.   Pt prognosis is Fair.     Pt will continue to benefit from skilled outpatient physical therapy to address the deficits listed in the problem list box on initial evaluation, provide pt/family education and to maximize pt's level of independence in the home and community environment.     Pt's spiritual, cultural and educational needs considered and pt agreeable to plan of care and goals.     Anticipated  barriers to physical therapy: none    Goals: Short Term Goals (4 Weeks):  1. Patient will demonstrate proper performance of home exercise program of active exercises to improve carryover between sessions.  2. The patient will perform transverse abdominis contraction isomerically for 5 seconds to improve spinal stability.  3. The patient will demonstrate increased lumbar AROM by 10  degrees in order to improve the patient's ability to lift objects from floor.  4. The patient will perform a forward bend with proper muscle sequencing in order to avoid overuse of the lumbar paraspinals when returning to flexed trunk position.  5. The patient will demonstrate an increase in lower extremity strength by 1/2 MMT grade in order to allow her ability to walk without hip drop.  6. The patient will report a decrease in pain level from 7/10 at worse to 0/10.   7. The patient will demonstrate the ability to stand for 30 minutes in order to perform bathing and grooming tasks independently.     Long Term Goals (8 Weeks):  Patient will be independent with all home exercises and progressions for management of symptoms.  Patient will improve FOTO score to </= see FOTO% limited to decrease perceived limitation with mobility.  3. The patient will perform a transverse abdominis contraction while performing dynamic LE/UE movements with good control to demonstrate improved spinal stability.   4. The patient will demonstrate the ability to carry 5# from multiple surface heights without experiencing impingement or shooting leg pain.   5. The patient will demonstrate increased strength of LE to >/= 4+/5 by discharge in order to return to functional activities.  6. The patient will complete 10 pain free repetitions of a functional squat with 0# in order to improve her ability to get in and out of a chair          Plan     Plan of care Certification: 9/9/2024 to 12/2/2024.     Outpatient Physical Therapy 1-2 times weekly for 10-12 weeks to  include the following interventions: Manual Therapy, Moist Heat/ Ice, Neuromuscular Re-ed, Patient Education, Self Care, Therapeutic Activities, and Therapeutic Exercise.     Joel Bloom, PT

## 2024-11-14 ENCOUNTER — CLINICAL SUPPORT (OUTPATIENT)
Dept: REHABILITATION | Facility: HOSPITAL | Age: 69
End: 2024-11-14
Payer: MEDICARE

## 2024-11-14 ENCOUNTER — HOSPITAL ENCOUNTER (OUTPATIENT)
Dept: RADIOLOGY | Facility: HOSPITAL | Age: 69
Discharge: HOME | End: 2024-11-14
Attending: STUDENT IN AN ORGANIZED HEALTH CARE EDUCATION/TRAINING PROGRAM
Payer: MEDICARE

## 2024-11-14 DIAGNOSIS — R29.898 DECREASED STRENGTH OF LOWER EXTREMITY: ICD-10-CM

## 2024-11-14 DIAGNOSIS — R91.1 SOLITARY PULMONARY NODULE: ICD-10-CM

## 2024-11-14 DIAGNOSIS — R26.89 DECREASED FUNCTIONAL MOBILITY: ICD-10-CM

## 2024-11-14 DIAGNOSIS — M53.86 DECREASED RANGE OF MOTION OF LUMBAR SPINE: Primary | ICD-10-CM

## 2024-11-14 DIAGNOSIS — F17.210 NICOTINE DEPENDENCE, CIGARETTES, UNCOMPLICATED: ICD-10-CM

## 2024-11-14 PROCEDURE — 97112 NEUROMUSCULAR REEDUCATION: CPT | Mod: KX

## 2024-11-14 PROCEDURE — 97110 THERAPEUTIC EXERCISES: CPT | Mod: KX

## 2024-11-14 PROCEDURE — 71271 CT THORAX LUNG CANCER SCR C-: CPT | Mod: TC

## 2024-11-14 NOTE — PROGRESS NOTES
Physical Therapy Daily Treatment Note     Name: Melani Brandt  Clinic Number: [id number]    Therapy Diagnosis:   Encounter Diagnoses   Name Primary?    Decreased range of motion of lumbar spine Yes    Decreased functional mobility     Decreased strength of lower extremity                    Physician: Bradley Robertson MD    Visit Date: 11/14/2024    Physician Orders: PT Eval and Treat   Medical Diagnosis from Referral: M54.42 (ICD-10-CM) - Acute left-sided low back pain with left-sided sciatica  Evaluation Date: 9/9/2024  Authorization Period Expiration: 12/31/2024  Plan of Care Expiration: 12/2/2024  Visit # / Visits authorized: 8/ 10   Follow UP FOTO: 10/22     Time In: 2:00 PM  Time Out: 3:00 PM     Total Billable Time: 55 minutes     Precautions: Standard    Subjective     Pt reports: Able to make her pain stop Saturday night at game after doing her stretches  She was compliant with home exercise program.  Response to previous treatment: no adverse reactions  Functional change: none    Pain: not verbalized/10  Location: left back      Objective   Objectives updated at progress note unless otherwise noted    Muscle Length Testing:  Psoas- R-positive    L negative     Adverse Neural Tension Testing:  Peroneol Nerve- L- positive R- negative      Melani received therapeutic exercises to develop strength, endurance, ROM, flexibility, posture, and core stabilization for 30 minutes including:  Prone quad stretch 30 seconds 5x  Seated lumbar flexion 20x   Lateral shift stretch at wall 20x  Daisy pose [id number]  Double knees to chest 20x    Melani received the following manual therapy techniques: Joint mobilizations, Manual traction, Myofacial release, Manual Lymphatic Drainage, Soft tissue Mobilization, and Friction Massage were applied to the: lumbar for 0 minutes, including:  Supine hip distraction oscillations      Melani participated in neuromuscular re-education activities to improve: Balance, Coordination,  Kinesthetic, Sense, Proprioception, and Posture for 30 minutes. The following activities were included:  Bridges 3x12  Supine peroneal nerve glides 2x20  Palloff Presses 3x15 2GTB      Not today:  Bird dogs LE movement 3x12  Lateral band walks GTB 3 laps- min symptoms noted w/ delayed onset afterwards    Melani participated in dynamic functional therapeutic activities to improve functional performance for 0  minutes, including:    Hip hinging w/ dowel seated on box 2x15  Suitcase carries 5# 1 lap, 10# 1 lap, 15# 2 laps (4 laps total)        Home Exercises Provided and Patient Education Provided     Education provided:   - Pt educated on HEP and POC    Written Home Exercises Provided: yes.  Exercises were reviewed and Melani was able to demonstrate them prior to the end of the session.  Melani demonstrated good  understanding of the education provided.     See EMR under Patient Instructions for exercises provided 9/17/2024.    Assessment     Pt presented with overall low irritability back to her baseline, improved since previous visit. Trialed more specific directional preference exercises today. Pt responded well to more specific flexion based exercises. Continued with core stabilization training. Will continue to progress as tolerated.    Melani Is progressing well towards her goals.   Pt prognosis is Fair.     Pt will continue to benefit from skilled outpatient physical therapy to address the deficits listed in the problem list box on initial evaluation, provide pt/family education and to maximize pt's level of independence in the home and community environment.     Pt's spiritual, cultural and educational needs considered and pt agreeable to plan of care and goals.     Anticipated barriers to physical therapy: none    Goals: Short Term Goals (4 Weeks):  1. Patient will demonstrate proper performance of home exercise program of active exercises to improve carryover between sessions.  2. The patient will  perform transverse abdominis contraction isomerically for 5 seconds to improve spinal stability.  3. The patient will demonstrate increased lumbar AROM by 10  degrees in order to improve the patient's ability to lift objects from floor.  4. The patient will perform a forward bend with proper muscle sequencing in order to avoid overuse of the lumbar paraspinals when returning to flexed trunk position.  5. The patient will demonstrate an increase in lower extremity strength by 1/2 MMT grade in order to allow her ability to walk without hip drop.  6. The patient will report a decrease in pain level from 7/10 at worse to 0/10.   7. The patient will demonstrate the ability to stand for 30 minutes in order to perform bathing and grooming tasks independently.     Long Term Goals (8 Weeks):  Patient will be independent with all home exercises and progressions for management of symptoms.  Patient will improve FOTO score to </= see FOTO% limited to decrease perceived limitation with mobility.  3. The patient will perform a transverse abdominis contraction while performing dynamic LE/UE movements with good control to demonstrate improved spinal stability.   4. The patient will demonstrate the ability to carry 5# from multiple surface heights without experiencing impingement or shooting leg pain.   5. The patient will demonstrate increased strength of LE to >/= 4+/5 by discharge in order to return to functional activities.  6. The patient will complete 10 pain free repetitions of a functional squat with 0# in order to improve her ability to get in and out of a chair          Plan     Plan of care Certification: 9/9/2024 to 12/2/2024.     Outpatient Physical Therapy 1-2 times weekly for 10-12 weeks to include the following interventions: Manual Therapy, Moist Heat/ Ice, Neuromuscular Re-ed, Patient Education, Self Care, Therapeutic Activities, and Therapeutic Exercise.     Joel Bloom, PT

## 2024-11-18 ENCOUNTER — TELEPHONE (OUTPATIENT)
Dept: PRIMARY CARE CLINIC | Facility: CLINIC | Age: 69
End: 2024-11-18
Payer: MEDICARE

## 2024-11-18 NOTE — TELEPHONE ENCOUNTER
----- Message from Bradley Robertson MD sent at 11/18/2024  9:01 AM CST -----  Pls let patient know that her CT scan is stable. Will repeat in 1 yr.

## 2024-11-21 ENCOUNTER — CLINICAL SUPPORT (OUTPATIENT)
Dept: REHABILITATION | Facility: HOSPITAL | Age: 69
End: 2024-11-21
Payer: MEDICARE

## 2024-11-21 DIAGNOSIS — R29.898 DECREASED STRENGTH OF LOWER EXTREMITY: ICD-10-CM

## 2024-11-21 DIAGNOSIS — R26.89 DECREASED FUNCTIONAL MOBILITY: ICD-10-CM

## 2024-11-21 DIAGNOSIS — M53.86 DECREASED RANGE OF MOTION OF LUMBAR SPINE: Primary | ICD-10-CM

## 2024-11-21 PROCEDURE — 97112 NEUROMUSCULAR REEDUCATION: CPT

## 2024-11-21 PROCEDURE — 97110 THERAPEUTIC EXERCISES: CPT

## 2024-11-21 NOTE — PROGRESS NOTES
Physical Therapy Daily Discharge Note     Name: Melani Brandt  Clinic Number: [id number]    Therapy Diagnosis:   Encounter Diagnoses   Name Primary?    Decreased range of motion of lumbar spine Yes    Decreased functional mobility     Decreased strength of lower extremity                      Physician: Bradley Robertson MD    Visit Date: 2024    Physician Orders: PT Eval and Treat   Medical Diagnosis from Referral: M54.42 (ICD-10-CM) - Acute left-sided low back pain with left-sided sciatica  Evaluation Date: 2024  Authorization Period Expiration: 2024  Plan of Care Expiration: 2024  Visit # / Visits authorized: 9/ 10  Foto 3/3    Time In: 1:00 PM  Time Out: 2:00 PM     Total Billable Time: 60 minutes     Precautions: Standard    Subjective     Pt reports: Hip is really bothering her today  She was compliant with home exercise program.  Response to previous treatment: no adverse reactions  Functional change: none    Pain: not verbalized/10  Location: left back      Objective   Objectives updated at progress note unless otherwise noted    Muscle Length Testing:  Psoas- R-positive    L negative     Adverse Neural Tension Testing:  Peroneol Nerve- L- positive R- negative     Standing Lumbar Range of Motion:     % Observation Pain   Flexion 100   no   Extension 50 Pivot point at lumbar no   Right Rotation 70   Yes, left side   Left Rotation 70   no   Right Sidebend 80   no   Left Sidebend 80   no            Hip Passive Range of Motion:     Right Left   Flexion 110 110   Internal Rotation 50 50   External Rotation 30 30            Lower Extremity Strength:    Right  Left    Quadriceps: 4+/5 4+/5   Hamstring at 90 de+/5 4+/5   Iliopsoas (sitting): / 3/5   Hip IR:   4/5   Hip ER  4/5        Melani received therapeutic exercises to develop strength, endurance, ROM, flexibility, posture, and core stabilization for 30 minutes including:  Prone quad stretch 30 seconds 5x  Seated lumbar  flexion 20x   reassessment    Melani received the following manual therapy techniques: Joint mobilizations, Manual traction, Myofacial release, Manual Lymphatic Drainage, Soft tissue Mobilization, and Friction Massage were applied to the: lumbar for 0 minutes, including:  Supine hip distraction oscillations      Melani participated in neuromuscular re-education activities to improve: Balance, Coordination, Kinesthetic, Sense, Proprioception, and Posture for 30 minutes. The following activities were included:  Bridges 3x12  Supine peroneal nerve glides 2x20  Palloff Presses 3x15 2GTB  Supine marches 3x10    Updated HEP    Melani participated in dynamic functional therapeutic activities to improve functional performance for 0  minutes, including:        Home Exercises Provided and Patient Education Provided     Education provided:   - Pt educated on HEP and POC    Written Home Exercises Provided: yes.  Exercises were reviewed and Melani was able to demonstrate them prior to the end of the session.  Melani demonstrated good  understanding of the education provided.     See EMR under Patient Instructions for exercises provided 9/17/2024.    Assessment     Pt presented with continued high irritbility from her L hip. Reassessment today revealed minimal objective improvements since initial evaluation. Improvements include improved LE strength, improved lumbopelvic control, and improved adverse neural tension. With that being said, the patient has hit a plateau with her rehab and would benefit from further medical management from her orthopedic physician provider. Plan to discharge from PT as this time and transition to a HEP.    Melani Is progressing well towards her goals.   Pt prognosis is Fair.     Pt will continue to benefit from skilled outpatient physical therapy to address the deficits listed in the problem list box on initial evaluation, provide pt/family education and to maximize pt's level of independence  in the home and community environment.     Pt's spiritual, cultural and educational needs considered and pt agreeable to plan of care and goals.     Anticipated barriers to physical therapy: none    Goals: Short Term Goals (4 Weeks):  1. Patient will demonstrate proper performance of home exercise program of active exercises to improve carryover between sessions.  2. The patient will perform transverse abdominis contraction isomerically for 5 seconds to improve spinal stability.  3. The patient will demonstrate increased lumbar AROM by 10  degrees in order to improve the patient's ability to lift objects from floor.  4. The patient will perform a forward bend with proper muscle sequencing in order to avoid overuse of the lumbar paraspinals when returning to flexed trunk position.  5. The patient will demonstrate an increase in lower extremity strength by 1/2 MMT grade in order to allow her ability to walk without hip drop.  6. The patient will report a decrease in pain level from 7/10 at worse to 0/10.   7. The patient will demonstrate the ability to stand for 30 minutes in order to perform bathing and grooming tasks independently.     Long Term Goals (8 Weeks):  Patient will be independent with all home exercises and progressions for management of symptoms.  Patient will improve FOTO score to </= see FOTO% limited to decrease perceived limitation with mobility.  3. The patient will perform a transverse abdominis contraction while performing dynamic LE/UE movements with good control to demonstrate improved spinal stability.   4. The patient will demonstrate the ability to carry 5# from multiple surface heights without experiencing impingement or shooting leg pain.   5. The patient will demonstrate increased strength of LE to >/= 4+/5 by discharge in order to return to functional activities.  6. The patient will complete 10 pain free repetitions of a functional squat with 0# in order to improve her ability to get in  and out of a chair          Plan     Discharge from PT    Joel Bloom, PT

## 2024-11-27 ENCOUNTER — OFFICE VISIT (OUTPATIENT)
Dept: PRIMARY CARE CLINIC | Facility: CLINIC | Age: 69
End: 2024-11-27
Payer: MEDICARE

## 2024-11-27 VITALS
SYSTOLIC BLOOD PRESSURE: 104 MMHG | HEART RATE: 84 BPM | DIASTOLIC BLOOD PRESSURE: 64 MMHG | OXYGEN SATURATION: 96 % | HEIGHT: 62 IN | WEIGHT: 108 LBS | BODY MASS INDEX: 19.88 KG/M2 | RESPIRATION RATE: 16 BRPM

## 2024-11-27 DIAGNOSIS — D50.9 IRON DEFICIENCY ANEMIA, UNSPECIFIED IRON DEFICIENCY ANEMIA TYPE: Primary | ICD-10-CM

## 2024-11-27 DIAGNOSIS — I70.0 AORTIC ATHEROSCLEROSIS: ICD-10-CM

## 2024-11-27 DIAGNOSIS — M54.42 CHRONIC LEFT-SIDED LOW BACK PAIN WITH LEFT-SIDED SCIATICA: ICD-10-CM

## 2024-11-27 DIAGNOSIS — G89.29 CHRONIC LEFT-SIDED LOW BACK PAIN WITH LEFT-SIDED SCIATICA: ICD-10-CM

## 2024-11-27 DIAGNOSIS — M81.0 AGE-RELATED OSTEOPOROSIS WITHOUT CURRENT PATHOLOGICAL FRACTURE: ICD-10-CM

## 2024-11-27 DIAGNOSIS — J43.8 OTHER EMPHYSEMA: ICD-10-CM

## 2024-11-27 DIAGNOSIS — E78.5 HYPERLIPIDEMIA, UNSPECIFIED HYPERLIPIDEMIA TYPE: ICD-10-CM

## 2024-11-27 PROCEDURE — 3008F BODY MASS INDEX DOCD: CPT | Mod: CPTII,S$GLB,, | Performed by: STUDENT IN AN ORGANIZED HEALTH CARE EDUCATION/TRAINING PROGRAM

## 2024-11-27 PROCEDURE — 3078F DIAST BP <80 MM HG: CPT | Mod: CPTII,S$GLB,, | Performed by: STUDENT IN AN ORGANIZED HEALTH CARE EDUCATION/TRAINING PROGRAM

## 2024-11-27 PROCEDURE — 1101F PT FALLS ASSESS-DOCD LE1/YR: CPT | Mod: CPTII,S$GLB,, | Performed by: STUDENT IN AN ORGANIZED HEALTH CARE EDUCATION/TRAINING PROGRAM

## 2024-11-27 PROCEDURE — 1160F RVW MEDS BY RX/DR IN RCRD: CPT | Mod: CPTII,S$GLB,, | Performed by: STUDENT IN AN ORGANIZED HEALTH CARE EDUCATION/TRAINING PROGRAM

## 2024-11-27 PROCEDURE — 1125F AMNT PAIN NOTED PAIN PRSNT: CPT | Mod: CPTII,S$GLB,, | Performed by: STUDENT IN AN ORGANIZED HEALTH CARE EDUCATION/TRAINING PROGRAM

## 2024-11-27 PROCEDURE — 99214 OFFICE O/P EST MOD 30 MIN: CPT | Mod: S$GLB,,, | Performed by: STUDENT IN AN ORGANIZED HEALTH CARE EDUCATION/TRAINING PROGRAM

## 2024-11-27 PROCEDURE — 99999 PR PBB SHADOW E&M-EST. PATIENT-LVL III: CPT | Mod: PBBFAC,,, | Performed by: STUDENT IN AN ORGANIZED HEALTH CARE EDUCATION/TRAINING PROGRAM

## 2024-11-27 PROCEDURE — 3074F SYST BP LT 130 MM HG: CPT | Mod: CPTII,S$GLB,, | Performed by: STUDENT IN AN ORGANIZED HEALTH CARE EDUCATION/TRAINING PROGRAM

## 2024-11-27 PROCEDURE — 3288F FALL RISK ASSESSMENT DOCD: CPT | Mod: CPTII,S$GLB,, | Performed by: STUDENT IN AN ORGANIZED HEALTH CARE EDUCATION/TRAINING PROGRAM

## 2024-11-27 PROCEDURE — 1159F MED LIST DOCD IN RCRD: CPT | Mod: CPTII,S$GLB,, | Performed by: STUDENT IN AN ORGANIZED HEALTH CARE EDUCATION/TRAINING PROGRAM

## 2024-11-27 RX ORDER — GABAPENTIN 100 MG/1
100 CAPSULE ORAL 3 TIMES DAILY
Qty: 90 CAPSULE | Refills: 5 | Status: SHIPPED | OUTPATIENT
Start: 2024-11-27 | End: 2025-05-26

## 2024-11-27 NOTE — ASSESSMENT & PLAN NOTE
I had started her on iron 5/2024. She is taking it every other day. Will check labs today. Ordered them.

## 2024-11-27 NOTE — ASSESSMENT & PLAN NOTE
Not able to tolerate alendronate. Has nausea for 5 hrs every time she takes it. She was approved for forteo but copay was 800. She will wait till next year.

## 2024-11-27 NOTE — PROGRESS NOTES
Clinic Note  11/27/2024      Subjective:       Patient ID:  Melani is a 69 y.o. female being seen for an established visit.    Chief Complaint: Follow-up    HPI  Melani Brandt is a 69 y.o.  female who presents with sciatica of left leg, scoliosis, current tobacco use (12 cigs a day), trigger fingers, psoriasis (only skin is affected), hx of basal cell (3), squamous cell carcinoma(2) is here for a f/u visit. Did acp 2024  -Did PT. Doing stretches daily. Her sciatic pain is much better. Taking gabapentin for pain, continue  -no falls   -refuses vaccines but UTD on screenings   -will do labs next time, will check iron panel then since she is taking iron  -she is scheduled to see ortho. She is considering hip replacement.   -she cut back on smoking from 12 cigs to 8-10 a day now. Continue to cut back.       Review of Systems   Constitutional:  Negative for chills and fever.   HENT:  Negative for congestion.    Respiratory:  Negative for cough and shortness of breath.    Cardiovascular:  Negative for chest pain.   Gastrointestinal:  Negative for abdominal pain, blood in stool, constipation and diarrhea.   Genitourinary:  Negative for dysuria and hematuria.   Neurological:  Negative for dizziness and headaches.       Medication List with Changes/Refills   Changed and/or Refilled Medications    Modified Medication Previous Medication    GABAPENTIN (NEURONTIN) 100 MG CAPSULE gabapentin (NEURONTIN) 100 MG capsule       Take 1 capsule (100 mg total) by mouth 3 (three) times daily.    Take 1 capsule (100 mg total) by mouth 3 (three) times daily.   Discontinued Medications    ALENDRONATE (FOSAMAX) 70 MG TABLET    Take 1 tablet (70 mg total) by mouth every 7 days. Take with a full glass of water & remain upright for at least 30 minutes    METHYLPREDNISOLONE (MEDROL DOSEPACK) 4 MG TABLET    use as directed           Objective:      /64 (BP Location: Right arm, Patient Position: Sitting)   Pulse 84   Resp 16   " Ht 5' 2" (1.575 m)   Wt 49 kg (108 lb 0.4 oz)   SpO2 96%   BMI 19.76 kg/m²   Estimated body mass index is 19.76 kg/m² as calculated from the following:    Height as of this encounter: 5' 2" (1.575 m).    Weight as of this encounter: 49 kg (108 lb 0.4 oz).  Physical Exam  Constitutional:       Appearance: Normal appearance.   Eyes:      Conjunctiva/sclera: Conjunctivae normal.   Cardiovascular:      Rate and Rhythm: Normal rate and regular rhythm.      Heart sounds: Normal heart sounds.   Pulmonary:      Effort: Pulmonary effort is normal. No respiratory distress.      Breath sounds: Normal breath sounds.   Abdominal:      General: Bowel sounds are normal.   Musculoskeletal:      Right lower leg: No edema.      Left lower leg: No edema.   Lymphadenopathy:      Cervical: No cervical adenopathy.   Skin:     General: Skin is warm.   Neurological:      Mental Status: She is alert and oriented to person, place, and time.           Assessment and Plan:     1. Iron deficiency anemia, unspecified iron deficiency anemia type  Assessment & Plan:  I had started her on iron 5/2024. She is taking it every other day. Will check labs today. Ordered them.     Orders:  -     IRON AND TIBC; Future; Expected date: 11/27/2024  -     FERRITIN; Future; Expected date: 11/27/2024  -     CBC Without Differential; Future; Expected date: 11/27/2024    2. Hyperlipidemia, unspecified hyperlipidemia type  Assessment & Plan:  Not at goal and patient not interested in statins. Discussed the risk especially since she is a smoker.    Orders:  -     Basic Metabolic Panel; Future; Expected date: 11/27/2024    3. Chronic left-sided low back pain with left-sided sciatica  Assessment & Plan:  Did PT. Doing stretches daily. Her sciatic pain is much better. Taking gabapentin for pain, continue      4. Age-related osteoporosis without current pathological fracture  Assessment & Plan:  Not able to tolerate alendronate. Has nausea for 5 hrs every time she " "takes it. She was approved for forteo but copay was 800. She will wait till next year.       5. Other emphysema  Overview:  Noted on CT chest from 11/2024. "The lungs show findings consistent with emphysema."     Assessment & Plan:  This change is a new development compared to the CT from a year ago. Patient not having any symptoms. Continues to smoke. Not interested in quitting      6. Aortic atherosclerosis  Assessment & Plan:  Noted on CT chest from 11/2024. Pt is not interested in statin. BP is controlled.       Other orders  -     gabapentin (NEURONTIN) 100 MG capsule; Take 1 capsule (100 mg total) by mouth 3 (three) times daily.  Dispense: 90 capsule; Refill: 5            Follow Up:   Follow up in about 3 months (around 2/27/2025).    Other Orders Placed This Visit:  Orders Placed This Encounter   Procedures    IRON AND TIBC    FERRITIN    Basic Metabolic Panel    CBC Without Differential         Bradley Robertson      "

## 2024-11-27 NOTE — ASSESSMENT & PLAN NOTE
Not at goal and patient not interested in statins. Discussed the risk especially since she is a smoker.

## 2024-11-27 NOTE — ASSESSMENT & PLAN NOTE
This change is a new development compared to the CT from a year ago. Patient not having any symptoms. Continues to smoke. Not interested in quitting

## 2024-11-27 NOTE — ASSESSMENT & PLAN NOTE
Did PT. Doing stretches daily. Her sciatic pain is much better. Taking gabapentin for pain, continue

## 2024-11-29 ENCOUNTER — LAB VISIT (OUTPATIENT)
Dept: LAB | Facility: HOSPITAL | Age: 69
End: 2024-11-29
Attending: STUDENT IN AN ORGANIZED HEALTH CARE EDUCATION/TRAINING PROGRAM
Payer: MEDICARE

## 2024-11-29 ENCOUNTER — TELEPHONE (OUTPATIENT)
Dept: PRIMARY CARE CLINIC | Facility: CLINIC | Age: 69
End: 2024-11-29
Payer: MEDICARE

## 2024-11-29 DIAGNOSIS — D50.9 IRON DEFICIENCY ANEMIA, UNSPECIFIED IRON DEFICIENCY ANEMIA TYPE: ICD-10-CM

## 2024-11-29 DIAGNOSIS — E78.5 HYPERLIPIDEMIA, UNSPECIFIED HYPERLIPIDEMIA TYPE: ICD-10-CM

## 2024-11-29 LAB
ANION GAP SERPL CALC-SCNC: 8 MMOL/L (ref 8–16)
BUN SERPL-MCNC: 16 MG/DL (ref 8–23)
CALCIUM SERPL-MCNC: 9.8 MG/DL (ref 8.7–10.5)
CHLORIDE SERPL-SCNC: 106 MMOL/L (ref 95–110)
CO2 SERPL-SCNC: 25 MMOL/L (ref 23–29)
CREAT SERPL-MCNC: 0.9 MG/DL (ref 0.5–1.4)
ERYTHROCYTE [DISTWIDTH] IN BLOOD BY AUTOMATED COUNT: 13.9 % (ref 11.5–14.5)
EST. GFR  (NO RACE VARIABLE): >60 ML/MIN/1.73 M^2
FERRITIN SERPL-MCNC: 25 NG/ML (ref 20–300)
GLUCOSE SERPL-MCNC: 91 MG/DL (ref 70–110)
HCT VFR BLD AUTO: 42.1 % (ref 37–48.5)
HGB BLD-MCNC: 13.5 G/DL (ref 12–16)
IRON SERPL-MCNC: 36 UG/DL (ref 30–160)
MCH RBC QN AUTO: 29.6 PG (ref 27–31)
MCHC RBC AUTO-ENTMCNC: 32.1 G/DL (ref 32–36)
MCV RBC AUTO: 92 FL (ref 82–98)
PLATELET # BLD AUTO: 356 K/UL (ref 150–450)
PMV BLD AUTO: 10.7 FL (ref 9.2–12.9)
POTASSIUM SERPL-SCNC: 4.9 MMOL/L (ref 3.5–5.1)
RBC # BLD AUTO: 4.56 M/UL (ref 4–5.4)
SATURATED IRON: 8 % (ref 20–50)
SODIUM SERPL-SCNC: 139 MMOL/L (ref 136–145)
TOTAL IRON BINDING CAPACITY: 429 UG/DL (ref 250–450)
TRANSFERRIN SERPL-MCNC: 290 MG/DL (ref 200–375)
WBC # BLD AUTO: 8.57 K/UL (ref 3.9–12.7)

## 2024-11-29 PROCEDURE — 83540 ASSAY OF IRON: CPT | Performed by: STUDENT IN AN ORGANIZED HEALTH CARE EDUCATION/TRAINING PROGRAM

## 2024-11-29 PROCEDURE — 80048 BASIC METABOLIC PNL TOTAL CA: CPT | Performed by: STUDENT IN AN ORGANIZED HEALTH CARE EDUCATION/TRAINING PROGRAM

## 2024-11-29 PROCEDURE — 36415 COLL VENOUS BLD VENIPUNCTURE: CPT | Performed by: STUDENT IN AN ORGANIZED HEALTH CARE EDUCATION/TRAINING PROGRAM

## 2024-11-29 PROCEDURE — 85027 COMPLETE CBC AUTOMATED: CPT | Performed by: STUDENT IN AN ORGANIZED HEALTH CARE EDUCATION/TRAINING PROGRAM

## 2024-11-29 PROCEDURE — 82728 ASSAY OF FERRITIN: CPT | Performed by: STUDENT IN AN ORGANIZED HEALTH CARE EDUCATION/TRAINING PROGRAM

## 2024-11-29 NOTE — TELEPHONE ENCOUNTER
----- Message from Bradley Robertson MD sent at 11/29/2024  3:37 PM CST -----  Pls let patient know that iron levels are better and she is not anemic anymore but continue to take iron like she is for another 3 months

## 2025-01-22 NOTE — TELEPHONE ENCOUNTER
----- Message from Bradley Robertson MD sent at 5/22/2024  2:25 PM CDT -----  Pls let patient know that her mammogram was negative masses. Will repeat in 1 yr. Tell her she does have osteoporosis. Would she be interested in treatment?   ??    Pls confirm  He was dx'ed by ER 1/14/25    This was given    OSELTAMIVIR (TAMIFLU) 30 MG CAPSULE    Take 1 capsule by mouth every other day for 4 days Take on Thursday after dialysis and on Saturday after dialysis      Because of renal fxn, he shouldn't be taking more than those pills    Was he referring to doxycycline?      doxycycline hyclate (VIBRA-TABS) 100 MG tablet [[id number]]    Order Details  Dose: 100 mg Route: Oral Frequency: 2 TIMES DAILY   Dispense Quantity: 20 tablet Refills: 0          Sig: Take 1 tablet by mouth 2 times daily for 10 days

## 2025-01-31 DIAGNOSIS — M25.552 LEFT HIP PAIN: Primary | ICD-10-CM

## 2025-02-03 ENCOUNTER — HOSPITAL ENCOUNTER (OUTPATIENT)
Dept: RADIOLOGY | Facility: HOSPITAL | Age: 70
Discharge: HOME | End: 2025-02-03
Attending: ORTHOPAEDIC SURGERY
Payer: MEDICARE

## 2025-02-03 ENCOUNTER — OFFICE VISIT (OUTPATIENT)
Dept: ORTHOPEDICS | Facility: CLINIC | Age: 70
End: 2025-02-03
Payer: MEDICARE

## 2025-02-03 VITALS — WEIGHT: 109.88 LBS | BODY MASS INDEX: 20.22 KG/M2 | HEIGHT: 62 IN

## 2025-02-03 DIAGNOSIS — M41.9 SCOLIOSIS OF THORACOLUMBAR SPINE, UNSPECIFIED SCOLIOSIS TYPE: ICD-10-CM

## 2025-02-03 DIAGNOSIS — M16.12 PRIMARY OSTEOARTHRITIS OF LEFT HIP: Primary | ICD-10-CM

## 2025-02-03 DIAGNOSIS — M25.552 LEFT HIP PAIN: ICD-10-CM

## 2025-02-03 PROCEDURE — 1159F MED LIST DOCD IN RCRD: CPT | Mod: CPTII,S$GLB,, | Performed by: ORTHOPAEDIC SURGERY

## 2025-02-03 PROCEDURE — 73502 X-RAY EXAM HIP UNI 2-3 VIEWS: CPT | Mod: TC,LT

## 2025-02-03 PROCEDURE — 1125F AMNT PAIN NOTED PAIN PRSNT: CPT | Mod: CPTII,S$GLB,, | Performed by: ORTHOPAEDIC SURGERY

## 2025-02-03 PROCEDURE — 3288F FALL RISK ASSESSMENT DOCD: CPT | Mod: CPTII,S$GLB,, | Performed by: ORTHOPAEDIC SURGERY

## 2025-02-03 PROCEDURE — 73502 X-RAY EXAM HIP UNI 2-3 VIEWS: CPT | Mod: 26,LT,, | Performed by: RADIOLOGY

## 2025-02-03 PROCEDURE — 3008F BODY MASS INDEX DOCD: CPT | Mod: CPTII,S$GLB,, | Performed by: ORTHOPAEDIC SURGERY

## 2025-02-03 PROCEDURE — 99204 OFFICE O/P NEW MOD 45 MIN: CPT | Mod: S$GLB,,, | Performed by: ORTHOPAEDIC SURGERY

## 2025-02-03 PROCEDURE — 1101F PT FALLS ASSESS-DOCD LE1/YR: CPT | Mod: CPTII,S$GLB,, | Performed by: ORTHOPAEDIC SURGERY

## 2025-02-03 PROCEDURE — 99999 PR PBB SHADOW E&M-EST. PATIENT-LVL II: CPT | Mod: PBBFAC,,, | Performed by: ORTHOPAEDIC SURGERY

## 2025-02-03 RX ORDER — IBUPROFEN 200 MG
200 TABLET ORAL EVERY 6 HOURS PRN
COMMUNITY

## 2025-02-03 NOTE — PROGRESS NOTES
Subjective:      Patient ID: Melani Brandt is a 69 y.o. female.    Chief Complaint: Pain of the Left Hip      History of Present Illness    CHIEF COMPLAINT:  - Left hip pain evaluation for potential hip replacement.    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  - Limitations in movement: inability to perform side-to-side sweeping motions, difficulty rising from the ground, and challenges in bending over  - Previous treatment included physical therapy, which was helpful, particularly for managing sciatic pain through spine-stretching exercises  - Currently takes Gabapentin and Aleve daily for pain management  - Previously informed by another healthcare provider that she would need her left hip replaced  - Seeking confirmation of this diagnosis due to ongoing difficulties with weight-bearing on the left leg  - Denies any history of blood clots    PREVIOUS TREATMENTS:  - Physical therapy for back pain and sciatica: Melani found it helpful, particularly for managing sciatic pain through spine-stretching exercises.    WORK STATUS:  - Works in [employer]  - Job involves sitting for most of the time  - Experiences difficulty sitting for long periods  - Frequently needs to get up and move around due to discomfort  - Experiences initial difficulty walking after prolonged sitting  - Takes medication (Aleve and Gabapentin)  in the morning to manage pain and enable work attendance         Past Medical History:   Diagnosis Date    Psoriasis     Scalp and elbows    Sciatica of left side     Scoliosis      Past Surgical History:   Procedure Laterality Date    AUGMENTATION OF BREAST      BASAL CELL CARCINOMA EXCISION      s/p removal x3    breast implants Bilateral 1999    chin implant  2009    CLAVICLE SURGERY Right 1955    COLONOSCOPY N/A 7/24/2024    Procedure: COLONOSCOPY;  Surgeon: Izaiah Estrada MD;  Location: [facility] ENDOSCOPY;  Service: Endoscopy;  Laterality: N/A;  Request Sean and this location Magalys Robertson MD   pt request no Versed  Miralax  Inst portal  was offered sooner dates  LW  pre call complete-GT    dental implants  2009    SQUAMOUS CELL CARCINOMA EXCISION      x2    TONSILLECTOMY  1959     Family History   Problem Relation Name Age of Onset    No Known Problems Mother      Melanoma Father      Cardiomyopathy Father      Diabetes type II Father       Social History     Socioeconomic History    Marital status:    Tobacco Use    Smoking status: Every Day     Current packs/day: 0.50     Average packs/day: 0.5 packs/day for 51.1 years (25.5 ttl pk-yrs)     Types: Cigarettes     Start date: 1974    Smokeless tobacco: Never   Substance and Sexual Activity    Alcohol use: Yes     Alcohol/week: 3.0 standard drinks of alcohol     Types: 3 Glasses of wine per week    Drug use: Never     Social Drivers of Health     Financial Resource Strain: Low Risk  (6/4/2024)    Overall Financial Resource Strain (CARDIA)     Difficulty of Paying Living Expenses: Not hard at all   Food Insecurity: No Food Insecurity (6/4/2024)    Hunger Vital Sign     Worried About Running Out of Food in the Last Year: Never true     Ran Out of Food in the Last Year: Never true   Physical Activity: Inactive (6/4/2024)    Exercise Vital Sign     Days of Exercise per Week: 0 days     Minutes of Exercise per Session: 0 min   Stress: No  "Stress Concern Present (6/4/2024)    [employer] - Occupational Stress Questionnaire     Feeling of Stress : Not at all   Housing Stability: Unknown (6/4/2024)    Housing Stability Vital Sign     Unable to Pay for Housing in the Last Year: No     Current Outpatient Medications on File Prior to Visit   Medication Sig Dispense Refill    gabapentin (NEURONTIN) 100 MG capsule Take 1 capsule (100 mg total) by mouth 3 (three) times daily. 90 capsule 5    ibuprofen (ADVIL,MOTRIN) 200 MG tablet Take 200 mg by mouth every 6 (six) hours as needed for Pain.       No current facility-administered medications on file prior to visit.     Review of patient's allergies indicates:   Allergen Reactions    Formaldehyde Swelling    Chloromycetin [chloramphenicol]     Formaldehyde analogues Hives       Review of Systems   Constitutional: Negative for chills, fever and night sweats.   HENT:  Negative for hearing loss.    Eyes:  Negative for blurred vision and double vision.   Cardiovascular:  Negative for chest pain, claudication and leg swelling.   Respiratory:  Negative for shortness of breath.    Endocrine: Negative for polydipsia, polyphagia and polyuria.   Hematologic/Lymphatic: Negative for adenopathy and bleeding problem. Does not bruise/bleed easily.   Skin:  Negative for poor wound healing.   Gastrointestinal:  Negative for diarrhea and heartburn.   Genitourinary:  Negative for bladder incontinence.   Neurological:  Negative for focal weakness, headaches, numbness, paresthesias and sensory change.   Psychiatric/Behavioral:  The patient is not nervous/anxious.    Allergic/Immunologic: Negative for persistent infections.         Objective:      Body mass index is 20.22 kg/m².  Vitals:    02/03/25 1414   Weight: 49.9 kg (109 lb 14.4 oz)   Height: 5' 1.81" (1.57 m)         General    Constitutional: She is oriented to person, place, and time. She appears well-developed and well-nourished.   HENT:   Head: " Normocephalic and atraumatic.   Eyes: EOM are normal.   Cardiovascular:  Normal rate.            Pulmonary/Chest: Effort normal.   Neurological: She is alert and oriented to person, place, and time.   Psychiatric: She has a normal mood and affect. Her behavior is normal.     General Musculoskeletal Exam   Gait: normal   Pelvic Obliquity: none and severe      Right Knee Exam     Inspection   Alignment:  normal  Effusion: absent    Left Knee Exam     Inspection   Alignment:  normal  Effusion: absent    Right Hip Exam     Inspection   Scars: absent  Swelling: absent  Bruising: absent  No deformity of hip.  Quadriceps Atrophy:  Negative  Erythema: absent    Range of Motion   Abduction:  25   Adduction:  20   Extension:  0   Flexion:  100   External rotation:  30   Internal rotation:  25     Tests   Pain w/ forced internal rotation (HARRY): absent  Stinchfield test: negative    Other   Sensation: normal  Left Hip Exam     Inspection   Scars: absent  Swelling: absent  No deformity of hip.  Quadriceps Atrophy:  negative  Erythema: absent  Bruising: absent    Tenderness   The patient tender to palpation of the trochanteric bursa.    Range of Motion   Abduction:  15   Adduction:  15   Extension:  0   Flexion:  100   External rotation:  20   Internal rotation: 15     Tests   Pain w/ forced internal rotation (HARRY): absent  Stinchfield test: positive    Other   Sensation: normal    Comments:  Apparent LLD: left is 2.0 cm longer      Back (L-Spine & T-Spine) / Neck (C-Spine) Exam   Back exam is normal.      Muscle Strength   Right Lower Extremity   Hip Abduction: 5/5   Hip Adduction: 5/5   Hip Flexion: 5/5   Ankle Dorsiflexion:  5/5   Left Lower Extremity   Hip Abduction: 5/5   Hip Adduction: 5/5   Hip Flexion: 5/5   Ankle Dorsiflexion:  5/5     Reflexes     Left Side  Quadriceps:  2+    Right Side   Quadriceps:  2+    Vascular Exam     Right Pulses  Dorsalis Pedis:      2+          Left Pulses  Dorsalis Pedis:       2+          Capillary Refill  Right Hand: normal capillary refill  Left Hand: normal capillary refill        Edema  Right Upper Leg: absent  Left Upper Leg: absent    Reviewed by me:  IMAGING:  - Radiographs: Demonstrate severe arthritic change of the left hip with nearly complete loss of articular cartilage, subchondral cysts, sclerosis, flattening of the femoral head, and osteophyte formation. Minimal degenerative change of the right hip, severe degenerative scoliosis of the lumbar spine, and osteophytes were also noted.                Assessment:       Encounter Diagnoses   Name Primary?    Primary osteoarthritis of left hip Yes    Scoliosis of thoracolumbar spine, unspecified scoliosis type           Plan:             Options were discussed.  Presently from a symptom standpoint in her radiographic standpoint she does not require hip replacement although this may be possible in the future.  She is not symptomatic enough for an injection.  This was discussed.  We both agree that having the degenerative scoliosis evaluated would be the next step.  We will get this set up for her and I will see her back after this occurs.      This note was generated with the assistance of ambient listening technology. Verbal consent was obtained by the patient and accompanying visitor(s) for the recording of patient appointment to facilitate this note. I attest to having reviewed and edited the generated note for accuracy, though some syntax or spelling errors may persist. Please contact the author of this note for any clarification.

## 2025-02-05 ENCOUNTER — TELEPHONE (OUTPATIENT)
Dept: ORTHOPEDICS | Facility: CLINIC | Age: 70
End: 2025-02-05
Payer: MEDICARE

## 2025-02-05 DIAGNOSIS — M54.50 LOW BACK PAIN, UNSPECIFIED BACK PAIN LATERALITY, UNSPECIFIED CHRONICITY, UNSPECIFIED WHETHER SCIATICA PRESENT: Primary | ICD-10-CM

## 2025-02-26 ENCOUNTER — OFFICE VISIT (OUTPATIENT)
Dept: ORTHOPEDICS | Facility: CLINIC | Age: 70
End: 2025-02-26
Payer: MEDICARE

## 2025-02-26 ENCOUNTER — HOSPITAL ENCOUNTER (OUTPATIENT)
Dept: RADIOLOGY | Facility: HOSPITAL | Age: 70
Discharge: HOME | End: 2025-02-26
Attending: ORTHOPAEDIC SURGERY
Payer: MEDICARE

## 2025-02-26 ENCOUNTER — OFFICE VISIT (OUTPATIENT)
Dept: PRIMARY CARE CLINIC | Facility: CLINIC | Age: 70
End: 2025-02-26
Payer: MEDICARE

## 2025-02-26 VITALS
BODY MASS INDEX: 20.77 KG/M2 | HEART RATE: 80 BPM | RESPIRATION RATE: 16 BRPM | OXYGEN SATURATION: 95 % | SYSTOLIC BLOOD PRESSURE: 112 MMHG | DIASTOLIC BLOOD PRESSURE: 84 MMHG | HEIGHT: 61 IN | WEIGHT: 110 LBS

## 2025-02-26 VITALS — BODY MASS INDEX: 20.85 KG/M2 | WEIGHT: 110.44 LBS | HEIGHT: 61 IN

## 2025-02-26 DIAGNOSIS — D50.9 IRON DEFICIENCY ANEMIA, UNSPECIFIED IRON DEFICIENCY ANEMIA TYPE: ICD-10-CM

## 2025-02-26 DIAGNOSIS — M51.362 DEGENERATION OF INTERVERTEBRAL DISC OF LUMBAR REGION WITH DISCOGENIC BACK PAIN AND LOWER EXTREMITY PAIN: ICD-10-CM

## 2025-02-26 DIAGNOSIS — M41.20 IDIOPATHIC SCOLIOSIS IN ADULT PATIENT: Primary | ICD-10-CM

## 2025-02-26 DIAGNOSIS — M54.16 LUMBAR RADICULOPATHY: ICD-10-CM

## 2025-02-26 DIAGNOSIS — M81.0 AGE-RELATED OSTEOPOROSIS WITHOUT CURRENT PATHOLOGICAL FRACTURE: Primary | ICD-10-CM

## 2025-02-26 DIAGNOSIS — J43.8 OTHER EMPHYSEMA: ICD-10-CM

## 2025-02-26 DIAGNOSIS — M54.9 DORSALGIA, UNSPECIFIED: ICD-10-CM

## 2025-02-26 DIAGNOSIS — M54.50 LOW BACK PAIN, UNSPECIFIED BACK PAIN LATERALITY, UNSPECIFIED CHRONICITY, UNSPECIFIED WHETHER SCIATICA PRESENT: ICD-10-CM

## 2025-02-26 DIAGNOSIS — G89.29 CHRONIC LEFT-SIDED LOW BACK PAIN WITH LEFT-SIDED SCIATICA: ICD-10-CM

## 2025-02-26 DIAGNOSIS — M54.42 CHRONIC LEFT-SIDED LOW BACK PAIN WITH LEFT-SIDED SCIATICA: ICD-10-CM

## 2025-02-26 DIAGNOSIS — M47.816 LUMBAR SPONDYLOSIS: ICD-10-CM

## 2025-02-26 PROCEDURE — 72110 X-RAY EXAM L-2 SPINE 4/>VWS: CPT | Mod: 26,,, | Performed by: RADIOLOGY

## 2025-02-26 PROCEDURE — 99214 OFFICE O/P EST MOD 30 MIN: CPT | Mod: S$GLB,,, | Performed by: STUDENT IN AN ORGANIZED HEALTH CARE EDUCATION/TRAINING PROGRAM

## 2025-02-26 PROCEDURE — 1158F ADVNC CARE PLAN TLK DOCD: CPT | Mod: CPTII,S$GLB,, | Performed by: STUDENT IN AN ORGANIZED HEALTH CARE EDUCATION/TRAINING PROGRAM

## 2025-02-26 PROCEDURE — 3008F BODY MASS INDEX DOCD: CPT | Mod: CPTII,S$GLB,, | Performed by: STUDENT IN AN ORGANIZED HEALTH CARE EDUCATION/TRAINING PROGRAM

## 2025-02-26 PROCEDURE — 1159F MED LIST DOCD IN RCRD: CPT | Mod: CPTII,S$GLB,, | Performed by: STUDENT IN AN ORGANIZED HEALTH CARE EDUCATION/TRAINING PROGRAM

## 2025-02-26 PROCEDURE — 3079F DIAST BP 80-89 MM HG: CPT | Mod: CPTII,S$GLB,, | Performed by: STUDENT IN AN ORGANIZED HEALTH CARE EDUCATION/TRAINING PROGRAM

## 2025-02-26 PROCEDURE — 3288F FALL RISK ASSESSMENT DOCD: CPT | Mod: CPTII,S$GLB,, | Performed by: STUDENT IN AN ORGANIZED HEALTH CARE EDUCATION/TRAINING PROGRAM

## 2025-02-26 PROCEDURE — 3074F SYST BP LT 130 MM HG: CPT | Mod: CPTII,S$GLB,, | Performed by: STUDENT IN AN ORGANIZED HEALTH CARE EDUCATION/TRAINING PROGRAM

## 2025-02-26 PROCEDURE — 99999 PR PBB SHADOW E&M-EST. PATIENT-LVL III: CPT | Mod: PBBFAC,,, | Performed by: ORTHOPAEDIC SURGERY

## 2025-02-26 PROCEDURE — 1160F RVW MEDS BY RX/DR IN RCRD: CPT | Mod: CPTII,S$GLB,, | Performed by: STUDENT IN AN ORGANIZED HEALTH CARE EDUCATION/TRAINING PROGRAM

## 2025-02-26 PROCEDURE — 1101F PT FALLS ASSESS-DOCD LE1/YR: CPT | Mod: CPTII,S$GLB,, | Performed by: STUDENT IN AN ORGANIZED HEALTH CARE EDUCATION/TRAINING PROGRAM

## 2025-02-26 PROCEDURE — 72110 X-RAY EXAM L-2 SPINE 4/>VWS: CPT | Mod: TC

## 2025-02-26 PROCEDURE — 1125F AMNT PAIN NOTED PAIN PRSNT: CPT | Mod: CPTII,S$GLB,, | Performed by: STUDENT IN AN ORGANIZED HEALTH CARE EDUCATION/TRAINING PROGRAM

## 2025-02-26 PROCEDURE — 99999 PR PBB SHADOW E&M-EST. PATIENT-LVL III: CPT | Mod: PBBFAC,,, | Performed by: STUDENT IN AN ORGANIZED HEALTH CARE EDUCATION/TRAINING PROGRAM

## 2025-02-26 NOTE — ASSESSMENT & PLAN NOTE
This change is a new development compared to the CT from a year ago. Patient not having any symptoms. Continues to smoke. Not interested in quitting. Asked her to cut back more, maybe to 8 cigarettes by next time.

## 2025-02-26 NOTE — ASSESSMENT & PLAN NOTE
Checked iron panel nov 2024. Iron sat still low but anemia has improved. Asked her to continue iron for a few more months. She is taking it with orange juice.

## 2025-02-26 NOTE — ASSESSMENT & PLAN NOTE
Tried alendronate, she did not tolerate. Forteo was too expensive, 800 dollars. She needs to see endo again, asked her to make an appt. Pt is taking calcium+ vitamin d. Asked pt to take vitamin d3 daily 2000 units.

## 2025-02-26 NOTE — PROGRESS NOTES
Clinic Note  2/26/2025      Subjective:       Patient ID:  Melani is a 69 y.o. female being seen for an established visit.    Chief Complaint: Follow-up    HPI  Melani Brandt is a 69 y.o.  female who presents with sciatica of left leg, scoliosis, current tobacco use (12 cigs a day), trigger fingers, psoriasis (only skin is affected), hx of basal cell (3), squamous cell carcinoma(2) is here for a f/u visit. Did acp 2025  -forteo was too expensive so she tried alendronate which she did not tolerate. She needs to see endo again  -no falls   -refuses vaccines   -She is considering hip replacement. At the time she saw ortho early this month, her symptoms and radiographic did not show requirement for hip replacement per dr. Vegas but he wants her to see spine specialist to get evaluated for scoliosis. Has appt with ortho today for that.   -still smoking 8-10 cigs a day. Next time she will try to cut down to 8 or less.   -labs next time.     Advance Care Planning     Date: 02/26/2025    Power of   I initiated the process of voluntary advance care planning today and explained the importance of this process to the patient.  I introduced the concept of advance directives to the patient, as well. Then the patient received detailed information about the importance of designating a Health Care Power of  (HCPOA). She was also instructed to communicate with this person about their wishes for future healthcare, should she become sick and lose decision-making capacity. The patient has previously appointed a HCPOA. After our discussion, the patient has decided to complete a HCPOA and has appointed her brother, health care agent: ashlee Brandt & health care agent number: [phone]. I encouraged her to communicate with this person about their wishes for future healthcare, should she become sick and lose decision-making capacity.      A total of 5 min was spent on advance care planning, goals of care  "discussion, emotional support, formulating and communicating prognosis and exploring burden/benefit of various approaches of treatment. This discussion occurred on a fully voluntary basis with the verbal consent of the patient and/or family.          Review of Systems   Constitutional:  Negative for chills and fever.   HENT:  Negative for congestion.    Respiratory:  Negative for cough and shortness of breath.    Cardiovascular:  Negative for chest pain.   Gastrointestinal:  Negative for abdominal pain, blood in stool, constipation and diarrhea.   Genitourinary:  Negative for dysuria and hematuria.   Musculoskeletal:  Positive for joint pain.   Neurological:  Negative for dizziness and headaches.       Medication List with Changes/Refills   Current Medications    GABAPENTIN (NEURONTIN) 100 MG CAPSULE    Take 1 capsule (100 mg total) by mouth 3 (three) times daily.    IBUPROFEN (ADVIL,MOTRIN) 200 MG TABLET    Take 200 mg by mouth every 6 (six) hours as needed for Pain.           Objective:      /84 (BP Location: Right arm, Patient Position: Sitting)   Pulse 80   Resp 16   Ht 5' 1" (1.549 m)   Wt 49.9 kg (110 lb 0.2 oz)   SpO2 95%   BMI 20.79 kg/m²   Estimated body mass index is 20.79 kg/m² as calculated from the following:    Height as of this encounter: 5' 1" (1.549 m).    Weight as of this encounter: 49.9 kg (110 lb 0.2 oz).  Physical Exam  Constitutional:       Appearance: Normal appearance.   Eyes:      Conjunctiva/sclera: Conjunctivae normal.   Cardiovascular:      Rate and Rhythm: Normal rate and regular rhythm.      Heart sounds: Normal heart sounds.   Pulmonary:      Effort: Pulmonary effort is normal. No respiratory distress.      Breath sounds: Normal breath sounds.   Abdominal:      General: Bowel sounds are normal.   Musculoskeletal:      Right lower leg: No edema.      Left lower leg: No edema.   Skin:     General: Skin is warm.   Neurological:      Mental Status: She is alert and oriented to " "person, place, and time.           Assessment and Plan:     1. Age-related osteoporosis without current pathological fracture  Assessment & Plan:  Tried alendronate, she did not tolerate. Forteo was too expensive, 800 dollars. She needs to see endo again, asked her to make an appt. Pt is taking calcium+ vitamin d. Asked pt to take vitamin d3 daily 2000 units.       2. Other emphysema  Overview:  Noted on CT chest from 11/2024. "The lungs show findings consistent with emphysema."     Assessment & Plan:  This change is a new development compared to the CT from a year ago. Patient not having any symptoms. Continues to smoke. Not interested in quitting. Asked her to cut back more, maybe to 8 cigarettes by next time.       3. Iron deficiency anemia, unspecified iron deficiency anemia type  Assessment & Plan:  Checked iron panel nov 2024. Iron sat still low but anemia has improved. Asked her to continue iron for a few more months. She is taking it with orange juice.       4. Chronic left-sided low back pain with left-sided sciatica  Assessment & Plan:  Seeing ortho for her spine today. Gabapentin helps, continue.                  Follow Up:   Follow up in about 6 months (around 8/26/2025).    Other Orders Placed This Visit:  No orders of the defined types were placed in this encounter.        Bradley Robertson        "

## 2025-02-26 NOTE — PROGRESS NOTES
DATE: 2/27/2025  PATIENT: eMlani Brandt    Attending Physician: Tonio Bowman M.D.    CHIEF COMPLAINT: LBP and LLE pain    HISTORY:  Melani Brandt is a 69 y.o. female  presents for initial evaluation of low back and left leg pain (Back - 3, Leg - 5). The pain has been present for several years. The patient describes the pain as sharp across the lower bck and down the left leg to the toes.  The pain is worse with standing straight up and improved by leaning forward. There is associated numbness and tingling. There is subjective weakness. Prior treatments have included PT, medications, but no NESS or surgery. She states she was told she had a curve in her back at age 15.    The Patient denies myelopathic symptoms such as handwriting changes or difficulty with buttons/coins/keys. Denies perineal paresthesias, bowel/bladder dysfunction.    The patient does not smoke, have DM or endorse IVDU. The patient is not on any blood thinners and does not take chronic narcotics. She works as a CPA.    PAST MEDICAL/SURGICAL HISTORY:  Past Medical History:   Diagnosis Date    Psoriasis     Scalp and elbows    Sciatica of left side     Scoliosis      Past Surgical History:   Procedure Laterality Date    AUGMENTATION OF BREAST      BASAL CELL CARCINOMA EXCISION      s/p removal x3    breast implants Bilateral 1999    chin implant  2009    CLAVICLE SURGERY Right 1955    COLONOSCOPY N/A 7/24/2024    Procedure: COLONOSCOPY;  Surgeon: Izaiah Estrada MD;  Location: [facility] ENDOSCOPY;  Service: Endoscopy;  Laterality: N/A;  Request Sean and this location Magalys Robertson MD   pt request no Versed  Miralax  Inst portal  was offered sooner dates  LW  pre call complete-GT    dental implants  2009    SQUAMOUS CELL CARCINOMA EXCISION      x2    TONSILLECTOMY  1959       Current Medications: Current Medications[1]    Social History: Social History[2]    REVIEW OF SYSTEMS:  Constitution: Negative. Negative for chills, fever and  "night sweats.   Cardiovascular: Negative for chest pain and syncope.   Respiratory: Negative for cough and shortness of breath.   Gastrointestinal: See HPI. Negative for nausea/vomiting. Negative for abdominal pain.  Genitourinary: See HPI. Negative for discoloration or dysuria.  Hematologic/Lymphatic: Negative for bleeding/clotting disorders.   Musculoskeletal: Negative for falls and muscle weakness.   Neurological: See HPI. No history of seizures. No history of cranial surgery or shunts.  Neurological: See HPI. No seizures.   Endocrine: Negative for polydipsia, polyphagia and polyuria.   Allergic/Immunologic: Negative for hives and persistent infections.     EXAM:  Ht 5' 1" (1.549 m)   Wt 50.1 kg (110 lb 7.2 oz)   BMI 20.87 kg/m²     PHYSICAL EXAMINATION:    General: The patient is a 69 y.o. female in no apparent distress, the patient is orientatied to person, place and time.  Psych: Normal mood and affect  HEENT: Vision grossly intact, hearing intact to the spoken word.  Lungs: Respirations unlabored.  Gait: Normal station and gait, no difficulty with toe or heel walk.   Skin: Dorsal lumbar skin negative for rashes, lesions, hairy patches and surgical scars. There is lumbar tenderness to palpation.  Range of motion: Lumbar range of motion is acceptable.  Spinal Balance: Global saggital and coronal spinal balance acceptable, no significant for scoliosis and kyphosis.  Musculoskeletal: No pain with the range of motion of the bilateral hips. No trochanteric tenderness to palpation.  Vascular: Bilateral lower extremities warm and well perfused, Dorsalis pedis pulses 2+ bilaterally.  Neurological: Normal strength and tone in all major motor groups in the bilateral lower extremities. 4/5 strength HF, KE LLE, Normal sensation to light touch in the L2-S1 dermatomes bilaterally.  Deep tendon reflexes symmetric  in the bilateral lower extremities.  Negative Babinski bilaterally. Straight leg raise negative " "bilaterally.    IMAGING:   Today I independently reviewed the following images and my interpretations are as follows:    AP, Lat and Flex/Ex  upright L-spine demonstrate scoliosis of the lumbar spine with disc space narrowing and osteophyte formation.    DEXA in 2024 showed lumbar T-score of 2.0 but worst T-score of -1.6 (total hip).    Body mass index is 20.87 kg/m².  No results found for: "HGBA1C"    ASSESSMENT/PLAN:    Diagnoses and all orders for this visit:    Idiopathic scoliosis in adult patient  -     X-Ray Scoliosis Complete; Future    Lumbar spondylosis    Lumbar radiculopathy    Degeneration of intervertebral disc of lumbar region with discogenic back pain and lower extremity pain    Dorsalgia, unspecified  -     MRI Lumbar Spine Without Contrast; Future      Follow up in about 4 weeks (around 3/26/2025).    Patient has lumbar spondylosis and degen scoliosis with LLE radiculopathy . I discussed the natural history of their diagnoses as well as surgical and nonsurgical treatment options. I educated the patient on the importance of core/back strengthening, correct posture, bending/lifting ergonomics, and low-impact aerobic exercises (walking, elliptical, and aquatherapy). Continue medications gabapentin and ibuprofen. I will refer the patient for MRI of L spine and scoliosis XR. Patient will follow up after MRI.    I have personally examined the patient and agree with the above plan.    Tonio Bowman MD  Orthopaedic Spine Surgeon  Department of Orthopaedic Surgery  [phone]           [1]   Current Outpatient Medications:     gabapentin (NEURONTIN) 100 MG capsule, Take 1 capsule (100 mg total) by mouth 3 (three) times daily., Disp: 90 capsule, Rfl: 5    ibuprofen (ADVIL,MOTRIN) 200 MG tablet, Take 200 mg by mouth every 6 (six) hours as needed for Pain., Disp: , Rfl:   [2]   Social History  Socioeconomic History    Marital status:    Tobacco Use    Smoking status: Every Day     Current packs/day: " 0.50     Average packs/day: 0.5 packs/day for 51.2 years (25.6 ttl pk-yrs)     Types: Cigarettes     Start date: 1974    Smokeless tobacco: Never   Substance and Sexual Activity    Alcohol use: Yes     Alcohol/week: 3.0 standard drinks of alcohol     Types: 3 Glasses of wine per week    Drug use: Never     Social Drivers of Health     Financial Resource Strain: Low Risk  (2/20/2025)    Overall Financial Resource Strain (CARDIA)     Difficulty of Paying Living Expenses: Not hard at all   Food Insecurity: No Food Insecurity (2/20/2025)    Hunger Vital Sign     Worried About Running Out of Food in the Last Year: Never true     Ran Out of Food in the Last Year: Never true   Transportation Needs: No Transportation Needs (2/20/2025)    PRAPARE - Transportation     Lack of Transportation (Medical): No     Lack of Transportation (Non-Medical): No   Physical Activity: Inactive (2/20/2025)    Exercise Vital Sign     Days of Exercise per Week: 0 days     Minutes of Exercise per Session: 0 min   Stress: No Stress Concern Present (2/20/2025)    [employer] - Occupational Stress Questionnaire     Feeling of Stress : Not at all   Housing Stability: Low Risk  (2/20/2025)    Housing Stability Vital Sign     Unable to Pay for Housing in the Last Year: No     Number of Times Moved in the Last Year: 0     Homeless in the Last Year: No

## 2025-02-26 NOTE — PROGRESS NOTES
Clinic Note  2/26/2025      Subjective:       Patient ID:  Melani is a 69 y.o. female being seen for an established visit.    Chief Complaint: Follow-up    HPI  Melani Brandt is a 69 y.o.  female who presents with sciatica of left leg, scoliosis, current tobacco use (12 cigs a day), trigger fingers, psoriasis (only skin is affected), hx of basal cell (3), squamous cell carcinoma(2) is here for a f/u visit. Did acp 2025  -forteo was too expensive so she tried alendronate which she did not tolerate. She needs to see endo again  -no falls   -refuses vaccines   -She is considering hip replacement. At the time she saw ortho early this month, her symptoms and radiographic did not show requirement for hip replacement per dr. Vegas but he wants her to see spine specialist to get evaluated for scoliosis. Has appt with ortho today for that.   -still smoking 8-10 cigs a day. Next time she will try to cut down to 8 or less.   -labs next time.     Advance Care Planning     Date: 02/26/2025    Power of   I initiated the process of voluntary advance care planning today and explained the importance of this process to the patient.  I introduced the concept of advance directives to the patient, as well. Then the patient received detailed information about the importance of designating a Health Care Power of  (HCPOA). She was also instructed to communicate with this person about their wishes for future healthcare, should she become sick and lose decision-making capacity. The patient has previously appointed a HCPOA. After our discussion, the patient has decided to complete a HCPOA and has appointed her brother, health care agent: ashlee Brandt & health care agent number: [phone]. I encouraged her to communicate with this person about their wishes for future healthcare, should she become sick and lose decision-making capacity.      A total of 5 min was spent on advance care planning, goals of care  "discussion, emotional support, formulating and communicating prognosis and exploring burden/benefit of various approaches of treatment. This discussion occurred on a fully voluntary basis with the verbal consent of the patient and/or family.          Review of Systems   Constitutional:  Negative for chills and fever.   HENT:  Negative for congestion and hearing loss.    Eyes:  Negative for discharge.   Respiratory:  Negative for cough, shortness of breath and wheezing.    Cardiovascular:  Negative for chest pain and palpitations.   Gastrointestinal:  Negative for abdominal pain, blood in stool, constipation, diarrhea and vomiting.   Genitourinary:  Negative for dysuria and hematuria.   Musculoskeletal:  Positive for joint pain. Negative for neck pain.   Neurological:  Negative for dizziness, weakness and headaches.   Endo/Heme/Allergies:  Negative for polydipsia.       Medication List with Changes/Refills   Current Medications    GABAPENTIN (NEURONTIN) 100 MG CAPSULE    Take 1 capsule (100 mg total) by mouth 3 (three) times daily.    IBUPROFEN (ADVIL,MOTRIN) 200 MG TABLET    Take 200 mg by mouth every 6 (six) hours as needed for Pain.           Objective:      /84 (BP Location: Right arm, Patient Position: Sitting)   Pulse 80   Resp 16   Ht 5' 1" (1.549 m)   Wt 49.9 kg (110 lb 0.2 oz)   SpO2 95%   BMI 20.79 kg/m²   Estimated body mass index is 20.79 kg/m² as calculated from the following:    Height as of this encounter: 5' 1" (1.549 m).    Weight as of this encounter: 49.9 kg (110 lb 0.2 oz).  Physical Exam  Constitutional:       Appearance: Normal appearance.   Eyes:      Conjunctiva/sclera: Conjunctivae normal.   Cardiovascular:      Rate and Rhythm: Normal rate and regular rhythm.      Heart sounds: Normal heart sounds.   Pulmonary:      Effort: Pulmonary effort is normal. No respiratory distress.      Breath sounds: Normal breath sounds.   Abdominal:      General: Bowel sounds are normal. " "  Musculoskeletal:      Right lower leg: No edema.      Left lower leg: No edema.   Skin:     General: Skin is warm.   Neurological:      Mental Status: She is alert and oriented to person, place, and time.           Assessment and Plan:     1. Age-related osteoporosis without current pathological fracture  Assessment & Plan:  Tried alendronate, she did not tolerate. Forteo was too expensive, 800 dollars. She needs to see endo again, asked her to make an appt. Pt is taking calcium+ vitamin d. Asked pt to take vitamin d3 daily 2000 units.       2. Other emphysema  Overview:  Noted on CT chest from 11/2024. "The lungs show findings consistent with emphysema."     Assessment & Plan:  This change is a new development compared to the CT from a year ago. Patient not having any symptoms. Continues to smoke. Not interested in quitting. Asked her to cut back more, maybe to 8 cigarettes by next time.       3. Iron deficiency anemia, unspecified iron deficiency anemia type  Assessment & Plan:  Checked iron panel nov 2024. Iron sat still low but anemia has improved. Asked her to continue iron for a few more months. She is taking it with orange juice.       4. Chronic left-sided low back pain with left-sided sciatica  Assessment & Plan:  Seeing ortho for her spine today. Gabapentin helps, continue.                  Follow Up:   Follow up in about 6 months (around 8/26/2025).    Other Orders Placed This Visit:  No orders of the defined types were placed in this encounter.        Bradley Robertson        "

## 2025-02-26 NOTE — PROGRESS NOTES
Clinic Note  2/26/2025      Subjective:       Patient ID:  Melani is a 69 y.o. female being seen for an established visit.    Chief Complaint: Follow-up    Ms. Brandt is here for her 3 month follow-up. She has no complaints today. Notes she will be following up with ortho re: spine evaluation. Patient recently started using a walker to ambulate in the mornings. Sciatica controlled on gabapentin and relieved with stretching. She has good days and bad days but unchanged from baseline. Patient is active and does not sit for prolonged periods of time at work. Notes joint swelling when she wakes up 2/2 arthritis. She is adherent on her medications. Needs to follow-up with endocrinology for medical management of her osteoporosis since Forteo co-pay was too expensive for her. Patient would benefit from pharmacy assistance. She is overall doing well and reports so falls or change in function.     Review of Systems   Constitutional:  Negative for chills, fever and malaise/fatigue.   HENT:  Negative for congestion, hearing loss and sore throat.    Eyes:  Negative for discharge.   Respiratory:  Negative for cough, shortness of breath and wheezing.    Cardiovascular:  Negative for chest pain, palpitations and leg swelling.   Gastrointestinal:  Negative for abdominal pain, blood in stool, constipation, diarrhea and vomiting.   Genitourinary:  Negative for dysuria, frequency and hematuria.   Musculoskeletal:  Negative for neck pain.   Neurological:  Negative for weakness and headaches.   Endo/Heme/Allergies:  Negative for polydipsia.     Answers submitted by the patient for this visit:  Review of Systems Questionnaire (Submitted on 2/21/2025)  activity change: No  unexpected weight change: No  rhinorrhea: No  trouble swallowing: No  visual disturbance: No  chest tightness: No  polyuria: No  difficulty urinating: No  menstrual problem: No  joint swelling: Yes  arthralgias: Yes  confusion: No  dysphoric mood: No    Medication List  "with Changes/Refills   Current Medications    GABAPENTIN (NEURONTIN) 100 MG CAPSULE    Take 1 capsule (100 mg total) by mouth 3 (three) times daily.    IBUPROFEN (ADVIL,MOTRIN) 200 MG TABLET    Take 200 mg by mouth every 6 (six) hours as needed for Pain.           Objective:      /84 (BP Location: Right arm, Patient Position: Sitting)   Pulse 80   Resp 16   Ht 5' 1" (1.549 m)   Wt 49.9 kg (110 lb 0.2 oz)   SpO2 95%   BMI 20.79 kg/m²   Estimated body mass index is 20.79 kg/m² as calculated from the following:    Height as of this encounter: 5' 1" (1.549 m).    Weight as of this encounter: 49.9 kg (110 lb 0.2 oz).  Physical Exam  Constitutional:       Appearance: Normal appearance.   HENT:      Head: Normocephalic and atraumatic.   Cardiovascular:      Rate and Rhythm: Normal rate and regular rhythm.      Pulses: Normal pulses.      Heart sounds: Normal heart sounds.   Pulmonary:      Effort: Pulmonary effort is normal.      Breath sounds: Normal breath sounds. No stridor. No wheezing or rales.   Musculoskeletal:         General: No swelling.   Neurological:      General: No focal deficit present.      Mental Status: She is alert.   Psychiatric:         Mood and Affect: Mood normal.         Behavior: Behavior normal.           Assessment and Plan:         Problem List Items Addressed This Visit       Age-related osteoporosis without current pathological fracture - Primary    Current Assessment & Plan   Tried alendronate, she did not tolerate. Forteo was too expensive, 800 dollars. She needs to see endo again, asked her to make an appt. Pt is taking calcium+ vitamin d. Asked pt to take vitamin d3 daily 2000 units.          Chronic left-sided low back pain with left-sided sciatica    Current Assessment & Plan   Seeing ortho for her spine today. Gabapentin helps, continue.          Iron deficiency anemia    Current Assessment & Plan   Checked iron panel nov 2024. Iron sat still low but anemia has improved. " "Asked her to continue iron for a few more months. She is taking it with orange juice.          Other emphysema    Overview   Noted on CT chest from 11/2024. "The lungs show findings consistent with emphysema."          Current Assessment & Plan   This change is a new development compared to the CT from a year ago. Patient not having any symptoms. Continues to smoke. Not interested in quitting. Asked her to cut back more, maybe to 8 cigarettes by next time.             Follow Up:   Follow up in about 6 months (around 8/26/2025).    Other Orders Placed This Visit:  No orders of the defined types were placed in this encounter.        Jenifer Cardoso  Medical Student, MS4      "

## 2025-02-27 ENCOUNTER — TELEPHONE (OUTPATIENT)
Dept: ORTHOPEDICS | Facility: CLINIC | Age: 70
End: 2025-02-27
Payer: MEDICARE

## 2025-02-27 ENCOUNTER — TELEPHONE (OUTPATIENT)
Dept: ENDOCRINOLOGY | Facility: CLINIC | Age: 70
End: 2025-02-27
Payer: MEDICARE

## 2025-02-27 ENCOUNTER — PATIENT MESSAGE (OUTPATIENT)
Dept: PRIMARY CARE CLINIC | Facility: CLINIC | Age: 70
End: 2025-02-27
Payer: MEDICARE

## 2025-02-27 NOTE — TELEPHONE ENCOUNTER
Spoke with patient and appt rescheduled. Patient verbally confirmed appt date/time.    ----- Message from Chilo sent at 2/27/2025 12:27 PM CST -----  Regarding: Appt  Contact: pt [phone]  Pt is requesting call back to reschedule appt 3/24 due to employment board meeting, please call pt [phone]

## 2025-02-27 NOTE — TELEPHONE ENCOUNTER
Reach out to patient about scheduling a virtual appointment. Provider asking if she can do a virtual appointment.

## 2025-02-27 NOTE — TELEPHONE ENCOUNTER
Call to patient, no answer, LVM requesting return call.     Call to central scheduling [phone] to assist with appt for endocrine, message sent.    Message sent by this CM to Endocrine for follow-up appt for osteoporosis treatment.

## 2025-03-06 ENCOUNTER — HOSPITAL ENCOUNTER (OUTPATIENT)
Dept: RADIOLOGY | Facility: HOSPITAL | Age: 70
Discharge: HOME | End: 2025-03-06
Attending: ORTHOPAEDIC SURGERY
Payer: MEDICARE

## 2025-03-06 DIAGNOSIS — M41.20 IDIOPATHIC SCOLIOSIS IN ADULT PATIENT: ICD-10-CM

## 2025-03-06 DIAGNOSIS — M54.9 DORSALGIA, UNSPECIFIED: ICD-10-CM

## 2025-03-06 PROCEDURE — 72082 X-RAY EXAM ENTIRE SPI 2/3 VW: CPT | Mod: TC

## 2025-03-06 PROCEDURE — 72148 MRI LUMBAR SPINE W/O DYE: CPT | Mod: TC

## 2025-03-06 PROCEDURE — 72148 MRI LUMBAR SPINE W/O DYE: CPT | Mod: 26,,, | Performed by: RADIOLOGY

## 2025-03-06 PROCEDURE — 72082 X-RAY EXAM ENTIRE SPI 2/3 VW: CPT | Mod: 26,,, | Performed by: RADIOLOGY

## 2025-03-07 ENCOUNTER — LAB VISIT (OUTPATIENT)
Dept: LAB | Facility: HOSPITAL | Age: 70
End: 2025-03-07
Attending: INTERNAL MEDICINE
Payer: MEDICARE

## 2025-03-07 ENCOUNTER — OFFICE VISIT (OUTPATIENT)
Dept: ENDOCRINOLOGY | Facility: CLINIC | Age: 70
End: 2025-03-07
Payer: MEDICARE

## 2025-03-07 VITALS
BODY MASS INDEX: 20.48 KG/M2 | WEIGHT: 111.31 LBS | SYSTOLIC BLOOD PRESSURE: 120 MMHG | HEART RATE: 86 BPM | HEIGHT: 62 IN | DIASTOLIC BLOOD PRESSURE: 64 MMHG | OXYGEN SATURATION: 96 %

## 2025-03-07 DIAGNOSIS — M81.0 AGE-RELATED OSTEOPOROSIS WITHOUT CURRENT PATHOLOGICAL FRACTURE: ICD-10-CM

## 2025-03-07 DIAGNOSIS — M81.0 AGE-RELATED OSTEOPOROSIS WITHOUT CURRENT PATHOLOGICAL FRACTURE: Primary | ICD-10-CM

## 2025-03-07 LAB
ALBUMIN SERPL BCP-MCNC: 4 G/DL (ref 3.5–5.2)
ALP SERPL-CCNC: 76 U/L (ref 40–150)
ALT SERPL W/O P-5'-P-CCNC: 16 U/L (ref 10–44)
ANION GAP SERPL CALC-SCNC: 9 MMOL/L (ref 8–16)
AST SERPL-CCNC: 35 U/L (ref 10–40)
BILIRUB SERPL-MCNC: 0.2 MG/DL (ref 0.1–1)
BUN SERPL-MCNC: 15 MG/DL (ref 8–23)
CALCIUM SERPL-MCNC: 9.8 MG/DL (ref 8.7–10.5)
CHLORIDE SERPL-SCNC: 103 MMOL/L (ref 95–110)
CO2 SERPL-SCNC: 29 MMOL/L (ref 23–29)
CREAT SERPL-MCNC: 0.8 MG/DL (ref 0.5–1.4)
EST. GFR  (NO RACE VARIABLE): >60 ML/MIN/1.73 M^2
GLUCOSE SERPL-MCNC: 75 MG/DL (ref 70–110)
POTASSIUM SERPL-SCNC: 4.1 MMOL/L (ref 3.5–5.1)
PROT SERPL-MCNC: 7 G/DL (ref 6–8.4)
SODIUM SERPL-SCNC: 141 MMOL/L (ref 136–145)

## 2025-03-07 PROCEDURE — 99999 PR PBB SHADOW E&M-EST. PATIENT-LVL III: CPT | Mod: PBBFAC,,, | Performed by: INTERNAL MEDICINE

## 2025-03-07 PROCEDURE — 80053 COMPREHEN METABOLIC PANEL: CPT | Performed by: INTERNAL MEDICINE

## 2025-03-07 PROCEDURE — 36415 COLL VENOUS BLD VENIPUNCTURE: CPT | Mod: PN | Performed by: INTERNAL MEDICINE

## 2025-03-07 NOTE — PATIENT INSTRUCTIONS
Osteoporosis Treatment    Regardless if you are on a prescription medication for osteoporosis or osteopenia, one should still do all of the following. All of these things combined help to reduce your fracture risk. The goal of all these treatments is to reduce your risk of fracture.     Take Calcium and Vitamin D- It is important to get a minimum amount of 1200 mg of calcium daily. That can be in the diet or in the form of a supplement. Also a minimum of 800 IU of Vitamin D should be taken a day. Taking a prescription medication does not take the place of taking Calcium plus vitamin D. Some trials show a reduced fracture risk with taking calcium and vitamin D. An example of calcium to take is calcium citrate (Citracal) which is over the counter. If you are taking an antacid medication (Proton pump inhibitor or H2 blocker) then it may be better to take calcium citrate over calcium carbonate.   Weight bearing exercise- this is extremely important to reduce fracture risk. Trials have shown that weight bearing exercise can reduce the risk of a hip fracture. It may also help with your bone density. At minimum one should do 30 minutes of weight bearing exercise 3 times a week. Yoga and Rajan Chi can often also help with balance.   Fall Precautions- the 1st goal in preventing a fracture is not falling. Always wear good shoes and avoid heals. Make sure you check your house to make sure there is nothing that could be a fall risk (diane, cords). Make sure if you get up at night that there is some lighting. Be mindful with pets as they can be common reasons for a fall. We often times feel safe in our own home, but that is a common area that one can have a fracture. If you usually use a walker or a cane, make sure you use it in the home as well.     Bone Density- once a prescription medication is started, we check your bone density every 2 years. It usually does not need to be checked more than that as your bone changes very  slowly. Always keep in mind the goal of therapy is to reduce your fracture risk and not normalize your bone density. Sometimes you may see a slight improvement in your bone density with treatment or no change at all. That is ok. One of the main reasons to do a bone density is to make sure there is not a decrease in your bone density    Drug Holidays- this does not apply to Prolia. We only do drug holidays for Fosamax, Reclast, Actonel and Boniva. Stopping or delaying Prolia can cause a rebound loss of bone density and in rare cases a compression fracture.     Risks of Medications for Osteoporosis  Most patients tolerate these medications without any problems. The following do not include all the side effects of these medications  Rarely patients can develop pains with these medications. They usually will go away. However, if they are severe you should let us know immediately  Osteonecrosis of the Jaw (ONJ)- this is a rare complication of many bone medications. It is diagnosed by a dentist or oral surgeon. If you develop pain in your jaw let us know and we have you see your dentist for an evaluation. Most cases are in patients with cancer who get much larger doses of these medications. The overall risk is 1 in 10,000 to 1 in 100,000 patient years. It is always important to get regular dental cleanings. If you are planning an invasive dental procedure we may ask that you complete that prior to starting treatment.   Atypical Femur Fractures- This is also a rare side effect of these medications. The risk increases the longer you are on these medications. This is one reason we sometimes do drug holidays. This can usually present with thigh or groin pain. The overall risk is 3.2 to 50 cases per 100,000 patient years

## 2025-03-07 NOTE — PROGRESS NOTES
CHIEF COMPLAINT: Osteoporosis  69 y.o. old being seen as a new patient to me. Had seen Endo on SS. Was given forteo, but cost was high. Recent MRI shows incomplete fractures for L1 and L3. Never had symptoms nor recalls a fall.No fractures as an adult. Taking Ca + D. No falls. Exercise limited. She does do a lot of gardening. She does smoke- 1/2 ppd. Trying to quit. NO steroids in the past. Tried fosamax in the past but developed GI s/e. States menopause in her 40's and on HRT for a short period of time. No history of MI or stroke.       PAST MEDICAL HISTORY/PAST SURGICAL HISTORY:  Reviewed in [facility]    SOCIAL HISTORY: Reviewed in [facility]    FAMILY HISTORY:  No known thyroid disease. No DM. Mother with osteoporosis. NO hip fractures.     MEDICATIONS/ALLERGIES: The patient's MedCard has been updated and reviewed.            PE:    GENERAL: Well developed, well nourished.  NECK: Supple, trachea midline, no palpable thyroid nodules  CHEST: Resp even and unlabored, CTA bilateral.  CARDIAC: RRR, S1, S2 heard, no murmurs, rubs, S3, or S4      LABS/Radiology   Latest Reference Range & Units 05/16/24 08:43 06/10/24 08:50   TSH 0.400 - 4.000 uIU/mL 2.138    PTH 9.0 - 77.0 pg/mL  37.5      Latest Reference Range & Units 06/10/24 08:50   Vitamin D 30 - 96 ng/mL 49      Latest Reference Range & Units 11/29/24 10:23   Sodium 136 - 145 mmol/L 139   Potassium 3.5 - 5.1 mmol/L 4.9   Chloride 95 - 110 mmol/L 106   CO2 23 - 29 mmol/L 25   Anion Gap 8 - 16 mmol/L 8   BUN 8 - 23 mg/dL 16   Creatinine 0.5 - 1.4 mg/dL 0.9   eGFR >60 mL/min/1.73 m^2 >60.0   Glucose 70 - 110 mg/dL 91   Calcium 8.7 - 10.5 mg/dL 9.8      Latest Reference Range & Units 06/10/24 08:41   Urine Calcium Absolute  mg/Spec 182     DXA BONE DENSITY AXIAL SKELETON 1 OR MORE SITES     CLINICAL HISTORY:  Asymptomatic menopausal state.  67 y/o female with no history of fractures.  She had menopausal symptoms at 45 y/o.  She has a history of Skin Ca.  She does not exercise.   Pt smokes.     TECHNIQUE:  DXA specification: [facility] Mark43 A (S/N [id number])     Bone Mineral Density scanning was performed over the hip and lumbar spine.     Review of the images confirms satisfactory positioning and technique.     COMPARISON:  No Comparisons     FINDINGS:  Lumbar spine (L1-L4):               T-score is 2.6, and Z-score is 4.7.     Femoral neck:                          T-score is -3.1, and Z-score is -1.2.     Total hip:                                T-score is -3.1, and Z-score is -1.6.     Distal 1/3 radius:                      Not applicable        Fracture Risk (FRAX)     17% risk of a major osteoporotic fracture in the next 10 years.     8.5% risk of hip fracture in the next 10 years.     Impression:     *Osteoporosis based on T-score below -2.5.    MRI lumbar spine.   Impression:     There are incomplete fractures of the L1 and L3 vertebral bodies without significant loss of vertebral body height.     Transitional lumbosacral anatomy contributing to levoscoliosis of the lumbar spine.  There is diffuse degenerative changes with varying degrees of foraminal stenosis, severe on the right at L3-4 and severe on the left at L4-5 and L5-S1.    ASSESSMENT/PLAN:  Osteoporosis-recent MRI shows incomplete fractures of L1 and L3 vertebral bodies.  Does not recall any trauma associated with it and has not had any significant pain.  Taking calcium vitamin-D.  Although weight-bearing exercise limited, she does do a lot of gardening and tries to stay active.  Discussed importance of weight-bearing exercise.  Gave information in AVS.  Could not tolerate oral bisphosphonates.  With MRI showing spinal fracture, would recommend an anabolic agent.  We can try Forteo again.  With changes in Medicare coverage, she states she may be able to afford now.  Refer to osteoporosis clinic.  Discussed if an anabolic started, it should be followed up with an anti resorptive  agent.    FOLLOWUP  CMP  Osteoporosis clinic for Forteo or Tymlos.

## 2025-03-20 ENCOUNTER — OFFICE VISIT (OUTPATIENT)
Dept: RHEUMATOLOGY | Facility: CLINIC | Age: 70
End: 2025-03-20
Payer: MEDICARE

## 2025-03-20 VITALS
WEIGHT: 110.88 LBS | HEIGHT: 62 IN | SYSTOLIC BLOOD PRESSURE: 128 MMHG | DIASTOLIC BLOOD PRESSURE: 62 MMHG | HEART RATE: 80 BPM | BODY MASS INDEX: 20.4 KG/M2

## 2025-03-20 DIAGNOSIS — Z91.89 FRACTURE RISK ASSESSMENT SCORE (FRAX) INDICATING GREATER THAN 3% RISK FOR HIP FRACTURE: ICD-10-CM

## 2025-03-20 DIAGNOSIS — Z87.81 HX OF FRACTURE: ICD-10-CM

## 2025-03-20 DIAGNOSIS — M81.0 AGE-RELATED OSTEOPOROSIS WITHOUT CURRENT PATHOLOGICAL FRACTURE: Primary | ICD-10-CM

## 2025-03-20 DIAGNOSIS — Z51.81 MEDICATION MONITORING ENCOUNTER: ICD-10-CM

## 2025-03-20 PROCEDURE — 99499 UNLISTED E&M SERVICE: CPT | Mod: S$GLB,,, | Performed by: INTERNAL MEDICINE

## 2025-03-20 PROCEDURE — 99999 PR PBB SHADOW E&M-EST. PATIENT-LVL III: CPT | Mod: PBBFAC,,,

## 2025-03-20 RX ORDER — ABALOPARATIDE 2000 UG/ML
80 INJECTION, SOLUTION SUBCUTANEOUS DAILY
Qty: 1.56 ML | Refills: 2 | Status: SHIPPED | OUTPATIENT
Start: 2025-03-20

## 2025-03-20 RX ORDER — PEN NEEDLE, DIABETIC 30 GX3/16"
NEEDLE, DISPOSABLE MISCELLANEOUS
Qty: 30 EACH | Refills: 6 | Status: SHIPPED | OUTPATIENT
Start: 2025-03-20

## 2025-03-20 NOTE — PATIENT INSTRUCTIONS
Recommend weight bearing exercises (chair exercises, chair yoga, ricardo chi, etc) 3 times weekly for 30 mins

## 2025-03-20 NOTE — PROGRESS NOTES
"Time: 67 mins  # of DX: 4    Subjective:     Chief Complaint & History of Present Illness:  Melani Brandt is a 69 y.o. female that is a new patient who is seen here today for discussion of Tymlos or Forteo and osteoporosis management. she was appropriately identified and referred by Shaw Robbins DO. Patient  has a past medical history of Osteoporosis, Psoriasis, Sciatica of left side, and Scoliosis.     Prior Osteoporosis Therapies:   Fosamax - GI SEs     Osteoporosis:  menopause in her 40's and on HRT for a short period of time.   Mother with osteoporosis  Personal Hx of fracture as an adult: yes, incomplete fractures for L1 and L3  Any fractures within the last year: yes, L1 & L3 fractures found on MRI earlier this month  Any falls within the last year: no  Has a walker at home to use if needed   Physically active: Exercise limited. She does do a lot of gardening, open to chair exercises/yoga  Smoker: yes, a little less than 0.5 packs per day; trying to quit - started wearing patches   Alcohol intake; number of drinks per week: 4 glasses of wine or drinks per week  Are you established with a dentist: yes  Do you see your dentist for regulary check-ups/exams (~6 months): yes  Discussed Tymlos and Forteo  Patient would like to Tymlos     Current calcium and Vit D intake:  Diet with adequate calcium: no  Supplements: Taking citracal + D 2 tabs slow release once a day & an additional vitamin D 1000 units once a day    Current Medications:  Current Outpatient Medications   Medication Instructions    gabapentin (NEURONTIN) 100 mg, Oral, 3 times daily    ibuprofen (ADVIL,MOTRIN) 200 mg, Every 6 hours PRN     Objective:     Vitals:    03/20/25 1421   BP: 128/62   Pulse: 80   Weight: 50.3 kg (110 lb 14.3 oz)   Height: 5' 2.01" (1.575 m)   PainSc:   4   PainLoc: Back      BP Readings from Last 4 Encounters:   03/07/25 120/64   02/26/25 112/84   11/27/24 104/64   08/27/24 124/66     Body mass index is 20.28 kg/m². " "    Alcohol Use: Not At Risk (2/20/2025)    AUDIT-C     Frequency of Alcohol Consumption: 2-3 times a week     Average Number of Drinks: 1 or 2     Frequency of Binge Drinking: Never      Tobacco Use: High Risk (3/7/2025)    Patient History     Smoking Tobacco Use: Every Day     Smokeless Tobacco Use: Never     Passive Exposure: Not on file      Physical Activity: Inactive (2/20/2025)    Exercise Vital Sign     Days of Exercise per Week: 0 days     Minutes of Exercise per Session: 0 min      Fall Risk Assessment: 03/20/2025  Completed "Check Your Risk for Falling" Questionnaire; score 2 for feel unsteady when walking sometimes (1) and worried about falling (1)    Monitoring Lab Results:  Lab Results   Component Value Date    CALCIUM 9.8 03/07/2025    ALBUMIN 4.0 03/07/2025    [id number] 49 06/10/2024    PHOS 3.0 06/10/2024    CREATININE 0.8 03/07/2025    EGFRNORACEVR >60.0 03/07/2025     Lab Results   Component Value Date    PTH 37.5 06/10/2024     Lab Results   Component Value Date     03/07/2025    K 4.1 03/07/2025     03/07/2025    CO2 29 03/07/2025    GLU 75 03/07/2025    BUN 15 03/07/2025    PROT 7.0 03/07/2025    BILITOT 0.2 03/07/2025    ALKPHOS 76 03/07/2025    AST 35 03/07/2025    ALT 16 03/07/2025     DEXA Results: 5/16/2024  Narrative & Impression  EXAMINATION:  DXA BONE DENSITY AXIAL SKELETON 1 OR MORE SITES     CLINICAL HISTORY:  Asymptomatic menopausal state.  67 y/o female with no history of fractures.  She had menopausal symptoms at 45 y/o.  She has a history of Skin Ca.  She does not exercise.  Pt smokes.     TECHNIQUE:  DXA specification: [facility] (S/N [id number])     Bone Mineral Density scanning was performed over the hip and lumbar spine.     Review of the images confirms satisfactory positioning and technique.     COMPARISON:  No Comparisons     FINDINGS:  Lumbar spine (L1-L4):               T-score is 2.6, and Z-score is 4.7.     Femoral neck:                      "     T-score is -3.1, and Z-score is -1.2.     Total hip:                                T-score is -3.1, and Z-score is -1.6.     Distal 1/3 radius:                      Not applicable        Fracture Risk (FRAX)     17% risk of a major osteoporotic fracture in the next 10 years.     8.5% risk of hip fracture in the next 10 years.     Impression:     *Osteoporosis based on T-score below -2.5.  *Fracture risk is very high due to T-score below -3.0.    MRI Lumbar Spine W/O Contrast  Narrative & Impression  EXAMINATION:  MRI LUMBAR SPINE WITHOUT CONTRAST     CLINICAL HISTORY:  Dorsalgia, unspecifiedLow back pain, symptoms persist with > 6wks conservative treatment;     TECHNIQUE:  Sagittal T1, sagittal T2, sagittal STIR, axial T1 and axial T2 weighted images of the lumbar spine obtained without contrast.     COMPARISON:  X-ray 02/26/2025     FINDINGS:  There is a levo scoliotic curvature of the lumbar spine with degenerative left lateral listhesis of L2 with respect L1 and L3 with respect to both L4 and to a lesser extent L2.  There is an oblique fracture line traversing from the anterior inferior to the posterosuperior corner of the L1 vertebral body with some bone edema but without significant loss of vertebral body height.  A similar incomplete fracture line is suspected involving the right-side of the inferior 3rd of the L3 vertebral body.  There is diffuse periarticular discogenic Modic type 1 edema signal endplate change throughout the lumbar spine.  There is transitional lumbosacral anatomy present with partial sacralization of L5 vertebra which demonstrates a pseudoarthrosis.     The conus is normal in appearance.  The adjacent soft tissue structures show no significant abnormalities.     There is severe generalized lower thoracic and lumbar disc desiccation, diffuse spondylitic spurring, and facet arthritis.     L1-L2: There is no focal disc herniation. No significant central canal or neural foraminal  narrowing.     L2-L3: There is no focal disc herniation. No central canal stenosis.  Mild right foraminal stenosis.     L3-L4: There is no focal disc herniation. No central canal stenosis.  Severe right foraminal stenosis     L4-L5: There is no focal disc herniation. No central canal stenosis.  Severe left foraminal stenosis.     L5-S1: There is no focal disc herniation. No central canal stenosis.  Severe left foraminal stenosis.     Impression:     There are incomplete fractures of the L1 and L3 vertebral bodies without significant loss of vertebral body height.     Transitional lumbosacral anatomy contributing to levoscoliosis of the lumbar spine.  There is diffuse degenerative changes with varying degrees of foraminal stenosis, severe on the right at L3-4 and severe on the left at L4-5 and L5-S1     Assessment:     Patient is a 69 y.o. female with osteoporosis. Patient was referred to the Rheumatology pharmacist for discussion of treatment and osteoporosis management. Provided patient with education about bone health and fall risk. Counseled patient about importance of calcium and vitamin D. Patient seems to be a good candidate for Tymlos due to being very high risk per the AACE guidelines. Patient is considered very high risk due to very low T-score (<-3.0), very high fracture probability by FRAX or another validated fracture risk algorithm , and recent fracture (within the past 12 months) . Plan is to start Tymlos.     Plan:      1. Age-related osteoporosis without current pathological fracture (Primary)  Initiate Tymlos 80 mcg SC daily; rx sent to [facility]  Scheduled 4/28/25 for tymlos injection training   Patient provided with handout of foods/drinks rich in calcium  Continue calcium and vitamin D  Continue smoking cessation   Recommend weight bearing exercises (chair exercises, chair yoga, walking, ricardo chi, etc) 3 times weekly for 30 mins  Discussed ways to decrease risk of falls  Patient verbalized understanding  instructions  Next DXA due 5/2026  Reviewed CMP and vitamin D    2. Fracture Risk Assessment Score (FRAX) indicating greater than 3% risk for hip fracture  See #1      3. Hx of fracture  See #1    4. Medication monitoring encounter  See #1     Follow-up in about 4 weeks    Chata Dorado, PharmD, [facility]  Rheumatology Clinical Pharmacist  [facility]

## 2025-03-24 DIAGNOSIS — Z00.00 ENCOUNTER FOR MEDICARE ANNUAL WELLNESS EXAM: ICD-10-CM

## 2025-03-25 ENCOUNTER — TELEPHONE (OUTPATIENT)
Dept: ENDOCRINOLOGY | Facility: CLINIC | Age: 70
End: 2025-03-25
Payer: MEDICARE

## 2025-03-25 NOTE — TELEPHONE ENCOUNTER
----- Message from [employer] sent at 3/24/2025  8:54 AM CDT -----  Type:  Needs Medical AdviceWho Called: AL w/prior authSymptoms (please be specific):How long has patient had these symptoms:  Pharmacy name and phone #:  Would the patient rather a call back or a response via [employer]? call back/Best Call Back Number: [phone] Information: Prior auth for abaloparatide (TYMLOS) 80 mcg (3,120 mcg/1.56 mL) PnIjDenied Submit appealThanks

## 2025-03-26 ENCOUNTER — OFFICE VISIT (OUTPATIENT)
Dept: ORTHOPEDICS | Facility: CLINIC | Age: 70
End: 2025-03-26
Attending: ORTHOPAEDIC SURGERY
Payer: MEDICARE

## 2025-03-26 DIAGNOSIS — M47.816 LUMBAR SPONDYLOSIS: ICD-10-CM

## 2025-03-26 DIAGNOSIS — M51.362 DEGENERATION OF INTERVERTEBRAL DISC OF LUMBAR REGION WITH DISCOGENIC BACK PAIN AND LOWER EXTREMITY PAIN: ICD-10-CM

## 2025-03-26 DIAGNOSIS — M41.20 IDIOPATHIC SCOLIOSIS IN ADULT PATIENT: ICD-10-CM

## 2025-03-26 DIAGNOSIS — M54.16 LUMBAR RADICULOPATHY: Primary | ICD-10-CM

## 2025-03-26 PROCEDURE — 99214 OFFICE O/P EST MOD 30 MIN: CPT | Mod: S$GLB,,, | Performed by: ORTHOPAEDIC SURGERY

## 2025-03-26 PROCEDURE — 1160F RVW MEDS BY RX/DR IN RCRD: CPT | Mod: CPTII,S$GLB,, | Performed by: ORTHOPAEDIC SURGERY

## 2025-03-26 PROCEDURE — 1159F MED LIST DOCD IN RCRD: CPT | Mod: CPTII,S$GLB,, | Performed by: ORTHOPAEDIC SURGERY

## 2025-03-26 PROCEDURE — 99999 PR PBB SHADOW E&M-EST. PATIENT-LVL II: CPT | Mod: PBBFAC,,, | Performed by: ORTHOPAEDIC SURGERY

## 2025-03-26 PROCEDURE — 1125F AMNT PAIN NOTED PAIN PRSNT: CPT | Mod: CPTII,S$GLB,, | Performed by: ORTHOPAEDIC SURGERY

## 2025-03-27 NOTE — PROGRESS NOTES
DATE: 3/27/2025  PATIENT: Melani Brnadt    Attending Physician: Tonio Bowman M.D.    HISTORY:  Melani Brandt is a 69 y.o. female who returns to me today for MRI review. Patient continues to have LBP that radiates down the left leg to the toes. She has been taking meds and doing exercises with some relief. She would like to continue conservative management.    She is about to start Tymlos for her osteoporosis.    The patient does not smoke, have DM or endorse IVDU. The patient is not on any blood thinners and does not take chronic narcotics. She works as a CPA.    PMH/PSH/FamHx/SocHx:  Unchanged from prior visit    ROS:  Positive for LBP  Denies perineal paresthesias, bowel or bladder incontinence    EXAM:  There were no vitals taken for this visit.    My physical examination was notable for the following findings: Normal strength and tone in all major motor groups in the bilateral lower extremities. 4/5 strength HF, KE LLE, Normal sensation to light touch in the L2-S1 dermatomes bilaterally.    IMAGING:  Today I independently reviewed the following images and my interpretations are as follows:    Previous L-spine XRs showed scoliosis of the lumbar spine with disc space narrowing and osteophyte formation.    Scoliosis Xrs showed no sagittal imbalance.    DEXA in 2024 showed lumbar T-score of 2.6 but worst T-score of -3.1 (FN).     Lumbar MRI showed no significant central stenosis. Severe R L3-4 and L L4-S1 foraminal stenosis. Possible L1 and L3 VCFs    ASSESSMENT/PLAN:  Patient has lumbar spondylosis, degen scoliosis, and stenosis with LLE radiculopathy. I discussed the natural history of their diagnoses as well as surgical and nonsurgical treatment options. I educated the patient on the importance of core/back strengthening, correct posture, bending/lifting ergonomics, and low-impact aerobic exercises (walking, elliptical, and aquatherapy). Continue medications and PT/exercises. Continue Tymlos for bone  health optimization. Patient will follow up PRN.    I provided the patient with a home exercise program. It is the AAOS spine conditioning program. Exercises include head rolls, kneeling back extension, sitting rotation stretch, modified seated side straddle, knee to chest, bird dog, plank, modified seated plank, hip bridges, abdominal bracing, and abdominal crunch. Pt will complete each exercise 5 times daily for 6-8 weeks.    Tonio Bowman MD  Orthopaedic Spine Surgeon  Department of Orthopaedic Surgery  [phone]

## 2025-04-08 ENCOUNTER — OFFICE VISIT (OUTPATIENT)
Dept: URGENT CARE | Facility: CLINIC | Age: 70
End: 2025-04-08
Payer: MEDICARE

## 2025-04-08 VITALS
TEMPERATURE: 98 F | OXYGEN SATURATION: 98 % | DIASTOLIC BLOOD PRESSURE: 72 MMHG | HEIGHT: 62 IN | WEIGHT: 110 LBS | SYSTOLIC BLOOD PRESSURE: 113 MMHG | BODY MASS INDEX: 20.24 KG/M2 | HEART RATE: 72 BPM | RESPIRATION RATE: 18 BRPM

## 2025-04-08 DIAGNOSIS — H00.011 HORDEOLUM EXTERNUM OF RIGHT UPPER EYELID: Primary | ICD-10-CM

## 2025-04-08 PROCEDURE — 99213 OFFICE O/P EST LOW 20 MIN: CPT | Mod: S$GLB,,, | Performed by: FAMILY MEDICINE

## 2025-04-08 RX ORDER — ERYTHROMYCIN 5 MG/G
OINTMENT OPHTHALMIC EVERY 8 HOURS
Qty: 1 G | Refills: 0 | Status: SHIPPED | OUTPATIENT
Start: 2025-04-08

## 2025-04-08 NOTE — PROGRESS NOTES
"Subjective:      Patient ID: Melani Brandt is a 69 y.o. female.    Vitals:  height is 5' 2.01" (1.575 m) and weight is 49.9 kg (110 lb). Her oral temperature is 97.7 °F (36.5 °C). Her blood pressure is 113/72 and her pulse is 72. Her respiration is 18 and oxygen saturation is 98%.     Chief Complaint: Other Misc (stye on my eyelid - Entered by patient) and Stye    This is a 69 y.o. female who presents today with a chief complaint of  stye on Rt eyelid x 1 week  Home Tx: warm compress    Eye Problem   The right eye is affected. The problem has been gradually improving. There was no injury mechanism. The pain is at a severity of 0/10. The patient is experiencing no pain. There is No known exposure to pink eye. She Does not wear contacts. Pertinent negatives include no blurred vision, eye discharge, double vision, eye redness, fever, foreign body sensation, itching, nausea, photophobia, recent URI or vomiting.     Constitution: Negative for fever.   Eyes:  Negative for eye discharge, eye itching, eye redness, photophobia, double vision and blurred vision.   Gastrointestinal:  Negative for nausea and vomiting.      Objective:     Physical Exam   Constitutional: No distress. normal  HENT:   Head: Normocephalic and atraumatic.   Nose: Nose normal.   Mouth/Throat: Mucous membranes are moist.   Eyes: Right eye exhibits hordeolum (upper lid mid position). Right conjunctiva is injected (upper lid edematous). Left conjunctiva is not injected.   Abdominal: Normal appearance.   Neurological: She is alert.     Assessment:     1. Hordeolum externum of right upper eyelid        Plan:       Hordeolum externum of right upper eyelid  -     erythromycin (ROMYCIN) ophthalmic ointment; Place into the right eye every 8 (eight) hours.  Dispense: 1 g; Refill: 0    Warm compresses to affected area. To see opthalmology if persists or worsens                "

## 2025-05-06 ENCOUNTER — TELEPHONE (OUTPATIENT)
Dept: RHEUMATOLOGY | Facility: CLINIC | Age: 70
End: 2025-05-06
Payer: MEDICARE

## 2025-05-06 DIAGNOSIS — Z87.81 HX OF FRACTURE: ICD-10-CM

## 2025-05-06 DIAGNOSIS — M81.0 AGE-RELATED OSTEOPOROSIS WITHOUT CURRENT PATHOLOGICAL FRACTURE: Primary | ICD-10-CM

## 2025-05-06 DIAGNOSIS — Z51.81 MEDICATION MONITORING ENCOUNTER: ICD-10-CM

## 2025-05-06 DIAGNOSIS — Z91.89 FRACTURE RISK ASSESSMENT SCORE (FRAX) INDICATING GREATER THAN 3% RISK FOR HIP FRACTURE: ICD-10-CM

## 2025-05-06 NOTE — TELEPHONE ENCOUNTER
Called patient. Started Tymlos on 4/29 and doing well. Scheduled for f/u in 3 months. Ordered CMP. Patient would like to get it done at [facility]    Staff,  Please call patient to schedule her for CMP 1 week or so before her f/u with me in August 8, 2025. Thanks!

## 2025-05-13 ENCOUNTER — PATIENT MESSAGE (OUTPATIENT)
Dept: ADMINISTRATIVE | Facility: OTHER | Age: 70
End: 2025-05-13
Payer: MEDICARE

## 2025-06-14 ENCOUNTER — PATIENT MESSAGE (OUTPATIENT)
Dept: ADMINISTRATIVE | Facility: OTHER | Age: 70
End: 2025-06-14
Payer: MEDICARE

## 2025-07-14 DIAGNOSIS — M81.0 AGE-RELATED OSTEOPOROSIS WITHOUT CURRENT PATHOLOGICAL FRACTURE: ICD-10-CM

## 2025-07-14 DIAGNOSIS — Z91.89 FRACTURE RISK ASSESSMENT SCORE (FRAX) INDICATING GREATER THAN 3% RISK FOR HIP FRACTURE: ICD-10-CM

## 2025-07-14 RX ORDER — ABALOPARATIDE 2000 UG/ML
80 INJECTION, SOLUTION SUBCUTANEOUS DAILY
Qty: 1.56 ML | Refills: 2 | Status: ACTIVE | OUTPATIENT
Start: 2025-07-14

## 2025-07-31 ENCOUNTER — LAB VISIT (OUTPATIENT)
Dept: LAB | Facility: HOSPITAL | Age: 70
End: 2025-07-31
Attending: INTERNAL MEDICINE
Payer: MEDICARE

## 2025-07-31 DIAGNOSIS — Z91.89 FRACTURE RISK ASSESSMENT SCORE (FRAX) INDICATING GREATER THAN 3% RISK FOR HIP FRACTURE: ICD-10-CM

## 2025-07-31 DIAGNOSIS — Z51.81 MEDICATION MONITORING ENCOUNTER: ICD-10-CM

## 2025-07-31 DIAGNOSIS — Z87.81 HX OF FRACTURE: ICD-10-CM

## 2025-07-31 DIAGNOSIS — M81.0 AGE-RELATED OSTEOPOROSIS WITHOUT CURRENT PATHOLOGICAL FRACTURE: ICD-10-CM

## 2025-07-31 LAB
ALBUMIN SERPL BCP-MCNC: 4.1 G/DL (ref 3.5–5.2)
ALP SERPL-CCNC: 73 UNIT/L (ref 40–150)
ALT SERPL W/O P-5'-P-CCNC: 17 UNIT/L (ref 0–55)
ANION GAP (OHS): 8 MMOL/L (ref 8–16)
AST SERPL-CCNC: 29 UNIT/L (ref 0–50)
BILIRUB SERPL-MCNC: 0.5 MG/DL (ref 0.1–1)
BUN SERPL-MCNC: 20 MG/DL (ref 8–23)
CALCIUM SERPL-MCNC: 10.1 MG/DL (ref 8.7–10.5)
CHLORIDE SERPL-SCNC: 103 MMOL/L (ref 95–110)
CO2 SERPL-SCNC: 27 MMOL/L (ref 23–29)
CREAT SERPL-MCNC: 0.8 MG/DL (ref 0.5–1.4)
GFR SERPLBLD CREATININE-BSD FMLA CKD-EPI: >60 ML/MIN/1.73/M2
GLUCOSE SERPL-MCNC: 118 MG/DL (ref 70–110)
POTASSIUM SERPL-SCNC: 4.5 MMOL/L (ref 3.5–5.1)
PROT SERPL-MCNC: 6.8 GM/DL (ref 6–8.4)
SODIUM SERPL-SCNC: 138 MMOL/L (ref 136–145)

## 2025-07-31 PROCEDURE — 36415 COLL VENOUS BLD VENIPUNCTURE: CPT

## 2025-07-31 PROCEDURE — 80053 COMPREHEN METABOLIC PANEL: CPT

## 2025-08-07 ENCOUNTER — OFFICE VISIT (OUTPATIENT)
Facility: CLINIC | Age: 70
End: 2025-08-07
Payer: MEDICARE

## 2025-08-07 DIAGNOSIS — Z87.81 HX OF FRACTURE: ICD-10-CM

## 2025-08-07 DIAGNOSIS — Z51.81 MEDICATION MONITORING ENCOUNTER: ICD-10-CM

## 2025-08-07 DIAGNOSIS — Z91.89 FRACTURE RISK ASSESSMENT SCORE (FRAX) INDICATING GREATER THAN 3% RISK FOR HIP FRACTURE: ICD-10-CM

## 2025-08-07 DIAGNOSIS — M81.0 AGE-RELATED OSTEOPOROSIS WITHOUT CURRENT PATHOLOGICAL FRACTURE: Primary | ICD-10-CM

## 2025-08-07 PROCEDURE — 99999 PR PBB SHADOW E&M-EST. PATIENT-LVL I: CPT | Mod: PBBFAC,,,

## 2025-08-07 PROCEDURE — 99499 UNLISTED E&M SERVICE: CPT | Mod: S$GLB,,, | Performed by: INTERNAL MEDICINE

## 2025-08-07 RX ORDER — ABALOPARATIDE 2000 UG/ML
80 INJECTION, SOLUTION SUBCUTANEOUS DAILY
Qty: 1.56 ML | Refills: 5 | OUTPATIENT
Start: 2025-08-07

## 2025-08-07 RX ORDER — PEN NEEDLE, DIABETIC 30 GX3/16"
NEEDLE, DISPOSABLE MISCELLANEOUS
Qty: 30 EACH | Refills: 6 | Status: SHIPPED | OUTPATIENT
Start: 2025-08-07 | End: 2025-08-07

## 2025-08-07 RX ORDER — PEN NEEDLE, DIABETIC 30 GX3/16"
NEEDLE, DISPOSABLE MISCELLANEOUS
Qty: 30 EACH | Refills: 6 | OUTPATIENT
Start: 2025-08-07

## 2025-08-07 NOTE — Clinical Note
Please schedule patient for Tymlos f/u appointment with me in 6 months. Please also schedule patient for needed CMP and vitamin D prior to appointment. Thanks!

## 2025-08-07 NOTE — PROGRESS NOTES
Time: 73 mins  # of DX: 4    Subjective:     Chief Complaint & History of Present Illness:  Melani Brandt is a 70 y.o. female that is an established patient who is seen here today for Osteoporosis follow-up. she was appropriately identified and referred by No ref. provider found. Presenting with Osteoporosis, Psoriasis, Sciatica of left side, and Scoliosis. Menopause in her 40's and on HRT for a short period of time.   Mother with osteoporosis     Review of patient's allergies indicates:   Allergen Reactions    Formaldehyde Swelling    Chloromycetin [chloramphenicol]     Formaldehyde analogues Hives       Past Medical History:   Diagnosis Date    Osteoporosis     Psoriasis     Scalp and elbows    Sciatica of left side     Scoliosis      Past Surgical History:   Procedure Laterality Date    AUGMENTATION OF BREAST      BASAL CELL CARCINOMA EXCISION      s/p removal x3    breast implants Bilateral 1999    chin implant  2009    CLAVICLE SURGERY Right 1955    COLONOSCOPY N/A 7/24/2024    Procedure: COLONOSCOPY;  Surgeon: Izaiah Estrada MD;  Location: [facility];  Service: Endoscopy;  Laterality: N/A;  Request Sean and this location ReferralBradley Robertson MD   pt request no Versed  Miralax  Inst portal  was offered sooner dates  LW  pre call complete-GT    dental implants  2009    SQUAMOUS CELL CARCINOMA EXCISION      x2    TONSILLECTOMY  1959     Prior Osteoporosis Therapies:   Fosamax - [facility]     Osteoporosis:    Current treatment: Tymlos 80 mcg SC daily via [facility]  Patient started Tymlos on 4/29/2025 and seems to be doing well; reports no side effects or issues with Tymlos  Any missed doses: no  Personal Hx of fracture as an adult: yes, incomplete fractures for L1 and L3   Any fractures within the last year: yes, L1 & L3 fractures found on MRI 3/2025   Any falls within the last year: no  Weight bearing exercises: yes, just started doing piliates at [employer]   Smoker: yes, about 0.5 packs per day  "sometimes more depending; trying to quit - started wearing patches which really helped but started having allergy - red rash from patch adhesive; stated the the nicotine patches really was helping and she was down to 3 cigarettes a day  Plans to discuss other options with PCP at upcoming appointment at the end of the month  Alcohol intake; number of drinks per week: 4 glasses of wine or drinks per week   Are you established with a dentist: yes  Do you see your dentist for regulary check-ups/exams (~6 months): yes    Current calcium and Vit D intake:  Diet with adequate calcium: no  Supplements: Taking citracal + D 2 tabs slow release once a day (has 1000 units of vitamin D) & an additional vitamin D 5000 units once a day    Current Medications:  Current Outpatient Medications   Medication Instructions    calcium-vitamin D3 (OS-COOKIE 500 + D3) 500 mg-5 mcg (200 unit) per tablet 1 tablet, 2 times daily with meals    erythromycin (ROMYCIN) ophthalmic ointment Right Eye, Every 8 hours    ferrous sulfate (FEOSOL) Tab tablet 1 tablet, With breakfast    gabapentin (NEURONTIN) 100 mg, Oral, 3 times daily    ibuprofen (ADVIL,MOTRIN) 200 mg, Every 6 hours PRN    multivit-min/iron/FA/vit K/lut (CENTRUM SILVER WOMEN ORAL) Take by mouth.    pen needle, diabetic 31 gauge x 5/16" Ndle Use as directed with Tymlos pen    TYMLOS 80 mcg, Subcutaneous, Daily     Objective:     BP Readings from Last 1 Encounters:   04/08/25 113/72     Ht Readings from Last 3 Encounters:   04/08/25 5' 2.01" (1.575 m)   03/20/25 5' 2.01" (1.575 m)   03/07/25 5' 2" (1.575 m)     Wt Readings from Last 3 Encounters:   04/08/25 0902 49.9 kg (110 lb)   03/20/25 1421 50.3 kg (110 lb 14.3 oz)   03/07/25 1459 50.5 kg (111 lb 5.3 oz)     Fall Risk Assessment: 3/20/2025  Completed "Check Your Risk for Falling" Questionnaire; score 2 for   Yes   No  []     [x] I have fallen in the past year (2)  []     [x] I use or have been advised to use a cane or walker to get " "around safely (2)  [x]     [] Sometimes I feel unsteady when I am walking (1)  []     [x] I steady myself by holding onto furniture when walking at home (1)  [x]     [] I am worried about falling (1)  []     [x] I need to push with my hands to stand up from a chair (1)  []     [x] I have some trouble stepping up onto a curb (1)  []     [x] I often have to rush to the toilet (1)  []     [x] I have lost some feeling in my feet (1)  []     [x] I take medicine that sometimes makes me feel light-headed or more tired than usual (1)  []     [x] I take medicine to help me sleep or improve my mood (1)  []     [x] I often feel sad or depressed (1)    Monitoring Lab Results:  Lab Results   Component Value Date    CALCIUM 10.1 07/31/2025    ALBUMIN 4.1 07/31/2025    [id number] 49 06/10/2024    PHOS 3.0 06/10/2024    CREATININE 0.8 07/31/2025    EGFRNORACEVR >60 07/31/2025     Lab Results   Component Value Date     07/31/2025    K 4.5 07/31/2025     07/31/2025    CO2 27 07/31/2025     (H) 07/31/2025    BUN 20 07/31/2025    PROT 6.8 07/31/2025    BILITOT 0.5 07/31/2025    ALKPHOS 73 07/31/2025    AST 29 07/31/2025    ALT 17 07/31/2025     Lab Results   Component Value Date    PTH 37.5 06/10/2024      Lab Results   Component Value Date    TSH 2.138 05/16/2024      No results found for: "FREET4"   No results found for: "HGBA1C", "ESTIMATEDAVG"   No results found for: "FREETESTOSTE"     DEXA Results: 5/16/2024  Narrative & Impression  EXAMINATION:  DXA BONE DENSITY AXIAL SKELETON 1 OR MORE SITES     CLINICAL HISTORY:  Asymptomatic menopausal state.  69 y/o female with no history of fractures.  She had menopausal symptoms at 47 y/o.  She has a history of Skin Ca.  She does not exercise.  Pt smokes.     TECHNIQUE:  DXA specification: [facility] (S/N [id number])     Bone Mineral Density scanning was performed over the hip and lumbar spine.     Review of the images confirms satisfactory positioning and " technique.     COMPARISON:  No Comparisons     FINDINGS:  Lumbar spine (L1-L4):               T-score is 2.6, and Z-score is 4.7.     Femoral neck:                          T-score is -3.1, and Z-score is -1.2.     Total hip:                                T-score is -3.1, and Z-score is -1.6.     Distal 1/3 radius:                      Not applicable        Fracture Risk (FRAX)     17% risk of a major osteoporotic fracture in the next 10 years.     8.5% risk of hip fracture in the next 10 years.     Impression:     *Osteoporosis based on T-score below -2.5.  *Fracture risk is very high due to T-score below -3.0.     MRI Lumbar Spine W/O Contrast: 3/6/2025  Narrative & Impression  EXAMINATION:  MRI LUMBAR SPINE WITHOUT CONTRAST     CLINICAL HISTORY:  Dorsalgia, unspecifiedLow back pain, symptoms persist with > 6wks conservative treatment;     TECHNIQUE:  Sagittal T1, sagittal T2, sagittal STIR, axial T1 and axial T2 weighted images of the lumbar spine obtained without contrast.     COMPARISON:  X-ray 02/26/2025     FINDINGS:  There is a levo scoliotic curvature of the lumbar spine with degenerative left lateral listhesis of L2 with respect L1 and L3 with respect to both L4 and to a lesser extent L2.  There is an oblique fracture line traversing from the anterior inferior to the posterosuperior corner of the L1 vertebral body with some bone edema but without significant loss of vertebral body height.  A similar incomplete fracture line is suspected involving the right-side of the inferior 3rd of the L3 vertebral body.  There is diffuse periarticular discogenic Modic type 1 edema signal endplate change throughout the lumbar spine.  There is transitional lumbosacral anatomy present with partial sacralization of L5 vertebra which demonstrates a pseudoarthrosis.     The conus is normal in appearance.  The adjacent soft tissue structures show no significant abnormalities.     There is severe generalized lower thoracic and  lumbar disc desiccation, diffuse spondylitic spurring, and facet arthritis.     L1-L2: There is no focal disc herniation. No significant central canal or neural foraminal narrowing.     L2-L3: There is no focal disc herniation. No central canal stenosis.  Mild right foraminal stenosis.     L3-L4: There is no focal disc herniation. No central canal stenosis.  Severe right foraminal stenosis     L4-L5: There is no focal disc herniation. No central canal stenosis.  Severe left foraminal stenosis.     L5-S1: There is no focal disc herniation. No central canal stenosis.  Severe left foraminal stenosis.     Impression:     There are incomplete fractures of the L1 and L3 vertebral bodies without significant loss of vertebral body height.     Transitional lumbosacral anatomy contributing to levoscoliosis of the lumbar spine.  There is diffuse degenerative changes with varying degrees of foraminal stenosis, severe on the right at L3-4 and severe on the left at L4-5 and L5-S1    Assessment:     Patient is a 70 y.o. female with osteoporosis. Patient here for Osteoporosis follow-up and osteoporosis management. Provided patient with education about bone health and fall risk. We have discussed the need for weight bearing exercises for fall prevention. Fall prevention & personal safety have been reviewed. Counseled patient about importance of calcium and vitamin D. The general recommendation for daily vitamin D is at least 1000 units and for daily calcium is 1200 mg. Patient seems to be a good candidate for Tymlos 80 mcg SC daily due to being very high risk per the AACE guidelines. Patient is considered very high risk due to very low T-score (<-3.0), very high fracture probability by FRAX or another validated fracture risk algorithm , and recent fracture (within the past 12 months). Discussed pharmaceutical treatment options. Information to include indications for therapy, risks and benefits to treatment, and important safety  information related to these treatments was provided and discussed. Handouts were given to the patient for her review on osteoporosis and other pharmacological treatment information related to other available treatment options. Patient has been on Tymlos and seems to be doing well on it. Plan is to continue Tymlos 80 mcg SC daily.     Plan:        ICD-10-CM ICD-9-CM   1. Age-related osteoporosis without current pathological fracture  M81.0 733.01   2. Hx of fracture  Z87.81 V15.51   3. Fracture Risk Assessment Score (FRAX) indicating greater than 3% risk for hip fracture  Z91.89 V49.89   4. Medication monitoring encounter  Z51.81 V58.83     1. Osteoporosis:   Continue Tymlos 80 mcg SC daily  Refills sent to [facility]  Patient provided with handout of foods/drinks rich in calcium  Continue Calcium & vitamin D  Concern about total daily dose of 6000 units of vitamin D (> recommended daily amt) - plan to have vitamin D checked at end of the month with potential labs from PCP  Continue smoking cessation  Recommend weight bearing exercises (chair exercises, chair yoga, walking, ricardo chi, etc) 3 times weekly for 30 mins  Discussed ways to decrease risk of falls  Patient verbalized understanding instructions  Next DXA due 5/2026  Reviewed CMP     2. Health Maintenance:     Ordered CMP and Vitamin D to be completed prior to next f/u     Follow-up in 6 months    Chata Dorado, PharmD, [facility]  Ambulatory Clinical Pharmacist - Bone Health  [facility]

## 2025-08-18 ENCOUNTER — PATIENT MESSAGE (OUTPATIENT)
Dept: ADMINISTRATIVE | Facility: OTHER | Age: 70
End: 2025-08-18
Payer: MEDICARE

## 2025-08-26 ENCOUNTER — OFFICE VISIT (OUTPATIENT)
Dept: PRIMARY CARE CLINIC | Facility: CLINIC | Age: 70
End: 2025-08-26
Payer: MEDICARE

## 2025-08-26 VITALS
DIASTOLIC BLOOD PRESSURE: 62 MMHG | HEART RATE: 82 BPM | BODY MASS INDEX: 20.61 KG/M2 | SYSTOLIC BLOOD PRESSURE: 122 MMHG | WEIGHT: 112 LBS | OXYGEN SATURATION: 97 % | HEIGHT: 62 IN

## 2025-08-26 DIAGNOSIS — M81.0 AGE-RELATED OSTEOPOROSIS WITHOUT CURRENT PATHOLOGICAL FRACTURE: ICD-10-CM

## 2025-08-26 DIAGNOSIS — D50.9 IRON DEFICIENCY ANEMIA, UNSPECIFIED IRON DEFICIENCY ANEMIA TYPE: ICD-10-CM

## 2025-08-26 DIAGNOSIS — Z12.31 SCREENING MAMMOGRAM FOR BREAST CANCER: ICD-10-CM

## 2025-08-26 DIAGNOSIS — E78.5 HYPERLIPIDEMIA, UNSPECIFIED HYPERLIPIDEMIA TYPE: Primary | ICD-10-CM

## 2025-08-26 PROCEDURE — 3078F DIAST BP <80 MM HG: CPT | Mod: CPTII,S$GLB,, | Performed by: STUDENT IN AN ORGANIZED HEALTH CARE EDUCATION/TRAINING PROGRAM

## 2025-08-26 PROCEDURE — 1126F AMNT PAIN NOTED NONE PRSNT: CPT | Mod: CPTII,S$GLB,, | Performed by: STUDENT IN AN ORGANIZED HEALTH CARE EDUCATION/TRAINING PROGRAM

## 2025-08-26 PROCEDURE — 99214 OFFICE O/P EST MOD 30 MIN: CPT | Mod: S$GLB,,, | Performed by: STUDENT IN AN ORGANIZED HEALTH CARE EDUCATION/TRAINING PROGRAM

## 2025-08-26 PROCEDURE — 1101F PT FALLS ASSESS-DOCD LE1/YR: CPT | Mod: CPTII,S$GLB,, | Performed by: STUDENT IN AN ORGANIZED HEALTH CARE EDUCATION/TRAINING PROGRAM

## 2025-08-26 PROCEDURE — 99999 PR PBB SHADOW E&M-EST. PATIENT-LVL III: CPT | Mod: PBBFAC,,, | Performed by: STUDENT IN AN ORGANIZED HEALTH CARE EDUCATION/TRAINING PROGRAM

## 2025-08-26 PROCEDURE — 3074F SYST BP LT 130 MM HG: CPT | Mod: CPTII,S$GLB,, | Performed by: STUDENT IN AN ORGANIZED HEALTH CARE EDUCATION/TRAINING PROGRAM

## 2025-08-26 PROCEDURE — 3008F BODY MASS INDEX DOCD: CPT | Mod: CPTII,S$GLB,, | Performed by: STUDENT IN AN ORGANIZED HEALTH CARE EDUCATION/TRAINING PROGRAM

## 2025-08-26 PROCEDURE — 3288F FALL RISK ASSESSMENT DOCD: CPT | Mod: CPTII,S$GLB,, | Performed by: STUDENT IN AN ORGANIZED HEALTH CARE EDUCATION/TRAINING PROGRAM

## 2025-08-26 PROCEDURE — 1159F MED LIST DOCD IN RCRD: CPT | Mod: CPTII,S$GLB,, | Performed by: STUDENT IN AN ORGANIZED HEALTH CARE EDUCATION/TRAINING PROGRAM

## 2025-08-26 PROCEDURE — 1160F RVW MEDS BY RX/DR IN RCRD: CPT | Mod: CPTII,S$GLB,, | Performed by: STUDENT IN AN ORGANIZED HEALTH CARE EDUCATION/TRAINING PROGRAM

## 2025-09-02 ENCOUNTER — LAB VISIT (OUTPATIENT)
Dept: LAB | Facility: HOSPITAL | Age: 70
End: 2025-09-02
Attending: STUDENT IN AN ORGANIZED HEALTH CARE EDUCATION/TRAINING PROGRAM
Payer: MEDICARE

## 2025-09-02 DIAGNOSIS — D50.9 IRON DEFICIENCY ANEMIA, UNSPECIFIED IRON DEFICIENCY ANEMIA TYPE: ICD-10-CM

## 2025-09-02 DIAGNOSIS — E78.5 HYPERLIPIDEMIA, UNSPECIFIED HYPERLIPIDEMIA TYPE: ICD-10-CM

## 2025-09-02 LAB
ABSOLUTE EOSINOPHIL (OHS): 0.78 K/UL
ABSOLUTE MONOCYTE (OHS): 0.92 K/UL (ref 0.3–1)
ABSOLUTE NEUTROPHIL COUNT (OHS): 5.33 K/UL (ref 1.8–7.7)
BASOPHILS # BLD AUTO: 0.08 K/UL
BASOPHILS NFR BLD AUTO: 0.9 %
CHOLEST SERPL-MCNC: 246 MG/DL (ref 120–199)
CHOLEST/HDLC SERPL: 3.7 {RATIO} (ref 2–5)
ERYTHROCYTE [DISTWIDTH] IN BLOOD BY AUTOMATED COUNT: 13.3 % (ref 11.5–14.5)
FERRITIN SERPL-MCNC: 37 NG/ML (ref 20–300)
HCT VFR BLD AUTO: 43.4 % (ref 37–48.5)
HDLC SERPL-MCNC: 66 MG/DL (ref 40–75)
HDLC SERPL: 26.8 % (ref 20–50)
HGB BLD-MCNC: 13.8 GM/DL (ref 12–16)
IMM GRANULOCYTES # BLD AUTO: 0.02 K/UL (ref 0–0.04)
IMM GRANULOCYTES NFR BLD AUTO: 0.2 % (ref 0–0.5)
IRON SATN MFR SERPL: 15 % (ref 20–50)
IRON SERPL-MCNC: 65 UG/DL (ref 30–160)
LDLC SERPL CALC-MCNC: 163.6 MG/DL (ref 63–159)
LYMPHOCYTES # BLD AUTO: 1.73 K/UL (ref 1–4.8)
MCH RBC QN AUTO: 30.3 PG (ref 27–31)
MCHC RBC AUTO-ENTMCNC: 31.8 G/DL (ref 32–36)
MCV RBC AUTO: 95 FL (ref 82–98)
NONHDLC SERPL-MCNC: 180 MG/DL
NUCLEATED RBC (/100WBC) (OHS): 0 /100 WBC
PLATELET # BLD AUTO: 334 K/UL (ref 150–450)
PMV BLD AUTO: 10.4 FL (ref 9.2–12.9)
RBC # BLD AUTO: 4.56 M/UL (ref 4–5.4)
RELATIVE EOSINOPHIL (OHS): 8.8 %
RELATIVE LYMPHOCYTE (OHS): 19.5 % (ref 18–48)
RELATIVE MONOCYTE (OHS): 10.4 % (ref 4–15)
RELATIVE NEUTROPHIL (OHS): 60.2 % (ref 38–73)
TIBC SERPL-MCNC: 435 UG/DL (ref 250–450)
TRANSFERRIN SERPL-MCNC: 294 MG/DL (ref 200–375)
TRIGL SERPL-MCNC: 82 MG/DL (ref 30–150)
TSH SERPL-ACNC: 3 UIU/ML (ref 0.4–4)
WBC # BLD AUTO: 8.86 K/UL (ref 3.9–12.7)

## 2025-09-02 PROCEDURE — 82728 ASSAY OF FERRITIN: CPT

## 2025-09-02 PROCEDURE — 84443 ASSAY THYROID STIM HORMONE: CPT

## 2025-09-02 PROCEDURE — 36415 COLL VENOUS BLD VENIPUNCTURE: CPT

## 2025-09-02 PROCEDURE — 85025 COMPLETE CBC W/AUTO DIFF WBC: CPT

## 2025-09-02 PROCEDURE — 83540 ASSAY OF IRON: CPT

## 2025-09-02 PROCEDURE — 82465 ASSAY BLD/SERUM CHOLESTEROL: CPT
